# Patient Record
Sex: MALE | Race: WHITE | NOT HISPANIC OR LATINO | URBAN - METROPOLITAN AREA
[De-identification: names, ages, dates, MRNs, and addresses within clinical notes are randomized per-mention and may not be internally consistent; named-entity substitution may affect disease eponyms.]

---

## 2024-02-11 PROBLEM — M51.26 LUMBAR HERNIATED DISC: Status: ACTIVE | Noted: 2024-02-11

## 2024-03-06 ENCOUNTER — OFFICE VISIT (OUTPATIENT)
Dept: PRIMARY CARE | Facility: CLINIC | Age: 64
End: 2024-03-06
Payer: COMMERCIAL

## 2024-03-06 ENCOUNTER — LAB (OUTPATIENT)
Dept: LAB | Facility: LAB | Age: 64
End: 2024-03-06
Payer: COMMERCIAL

## 2024-03-06 VITALS
BODY MASS INDEX: 27 KG/M2 | HEART RATE: 57 BPM | HEIGHT: 67 IN | OXYGEN SATURATION: 96 % | WEIGHT: 172 LBS | DIASTOLIC BLOOD PRESSURE: 80 MMHG | SYSTOLIC BLOOD PRESSURE: 136 MMHG

## 2024-03-06 DIAGNOSIS — K21.00 GASTROESOPHAGEAL REFLUX DISEASE WITH ESOPHAGITIS WITHOUT HEMORRHAGE: ICD-10-CM

## 2024-03-06 DIAGNOSIS — E66.3 OVERWEIGHT WITH BODY MASS INDEX (BMI) OF 26 TO 26.9 IN ADULT: ICD-10-CM

## 2024-03-06 DIAGNOSIS — Z13.220 SCREENING, LIPID: ICD-10-CM

## 2024-03-06 DIAGNOSIS — R53.83 FATIGUE, UNSPECIFIED TYPE: ICD-10-CM

## 2024-03-06 DIAGNOSIS — Z13.31 NEGATIVE DEPRESSION SCREENING: ICD-10-CM

## 2024-03-06 DIAGNOSIS — Z00.00 ENCOUNTER FOR HEALTH MAINTENANCE EXAMINATION IN ADULT: ICD-10-CM

## 2024-03-06 DIAGNOSIS — K21.9 GASTROESOPHAGEAL REFLUX DISEASE, UNSPECIFIED WHETHER ESOPHAGITIS PRESENT: ICD-10-CM

## 2024-03-06 DIAGNOSIS — E55.9 VITAMIN D DEFICIENCY: ICD-10-CM

## 2024-03-06 DIAGNOSIS — N40.0 BENIGN PROSTATIC HYPERPLASIA, UNSPECIFIED WHETHER LOWER URINARY TRACT SYMPTOMS PRESENT: ICD-10-CM

## 2024-03-06 DIAGNOSIS — R19.5 POSITIVE COLORECTAL CANCER SCREENING USING COLOGUARD TEST: Primary | ICD-10-CM

## 2024-03-06 DIAGNOSIS — K22.2 SCHATZKI'S RING: ICD-10-CM

## 2024-03-06 DIAGNOSIS — R19.5 POSITIVE COLORECTAL CANCER SCREENING USING COLOGUARD TEST: ICD-10-CM

## 2024-03-06 PROBLEM — M54.9 BACK PAIN: Status: RESOLVED | Noted: 2024-03-06 | Resolved: 2024-03-06

## 2024-03-06 PROBLEM — R50.9 FEVER: Status: RESOLVED | Noted: 2018-02-20 | Resolved: 2024-03-06

## 2024-03-06 LAB
25(OH)D3 SERPL-MCNC: 33 NG/ML (ref 30–100)
ALBUMIN SERPL BCP-MCNC: 4.2 G/DL (ref 3.4–5)
ALP SERPL-CCNC: 65 U/L (ref 33–136)
ALT SERPL W P-5'-P-CCNC: 13 U/L (ref 10–52)
ANION GAP SERPL CALC-SCNC: 13 MMOL/L (ref 10–20)
APPEARANCE UR: CLEAR
AST SERPL W P-5'-P-CCNC: 22 U/L (ref 9–39)
BILIRUB SERPL-MCNC: 0.9 MG/DL (ref 0–1.2)
BILIRUB UR STRIP.AUTO-MCNC: NEGATIVE MG/DL
BUN SERPL-MCNC: 17 MG/DL (ref 6–23)
CALCIUM SERPL-MCNC: 9.3 MG/DL (ref 8.6–10.6)
CHLORIDE SERPL-SCNC: 107 MMOL/L (ref 98–107)
CHOLEST SERPL-MCNC: 174 MG/DL (ref 0–199)
CHOLESTEROL/HDL RATIO: 3.1
CO2 SERPL-SCNC: 27 MMOL/L (ref 21–32)
COLOR UR: YELLOW
CREAT SERPL-MCNC: 1.11 MG/DL (ref 0.5–1.3)
EGFRCR SERPLBLD CKD-EPI 2021: 74 ML/MIN/1.73M*2
ERYTHROCYTE [DISTWIDTH] IN BLOOD BY AUTOMATED COUNT: 12.9 % (ref 11.5–14.5)
GLUCOSE SERPL-MCNC: 88 MG/DL (ref 74–99)
GLUCOSE UR STRIP.AUTO-MCNC: NORMAL MG/DL
HCT VFR BLD AUTO: 37.2 % (ref 41–52)
HDLC SERPL-MCNC: 55.4 MG/DL
HGB BLD-MCNC: 12.6 G/DL (ref 13.5–17.5)
KETONES UR STRIP.AUTO-MCNC: NEGATIVE MG/DL
LDLC SERPL CALC-MCNC: 80 MG/DL
LEUKOCYTE ESTERASE UR QL STRIP.AUTO: NEGATIVE
MCH RBC QN AUTO: 31.2 PG (ref 26–34)
MCHC RBC AUTO-ENTMCNC: 33.9 G/DL (ref 32–36)
MCV RBC AUTO: 92 FL (ref 80–100)
NITRITE UR QL STRIP.AUTO: NEGATIVE
NON HDL CHOLESTEROL: 119 MG/DL (ref 0–149)
NRBC BLD-RTO: 0 /100 WBCS (ref 0–0)
PH UR STRIP.AUTO: 5.5 [PH]
PLATELET # BLD AUTO: 197 X10*3/UL (ref 150–450)
POTASSIUM SERPL-SCNC: 4.1 MMOL/L (ref 3.5–5.3)
PROT SERPL-MCNC: 6.9 G/DL (ref 6.4–8.2)
PROT UR STRIP.AUTO-MCNC: NEGATIVE MG/DL
PSA SERPL-MCNC: 1.54 NG/ML
RBC # BLD AUTO: 4.04 X10*6/UL (ref 4.5–5.9)
RBC # UR STRIP.AUTO: NEGATIVE /UL
SODIUM SERPL-SCNC: 143 MMOL/L (ref 136–145)
SP GR UR STRIP.AUTO: 1.02
TESTOST SERPL-MCNC: 513 NG/DL (ref 240–1000)
TRIGL SERPL-MCNC: 194 MG/DL (ref 0–149)
TSH SERPL-ACNC: 2.64 MIU/L (ref 0.44–3.98)
UROBILINOGEN UR STRIP.AUTO-MCNC: NORMAL MG/DL
VLDL: 39 MG/DL (ref 0–40)
WBC # BLD AUTO: 6.1 X10*3/UL (ref 4.4–11.3)

## 2024-03-06 PROCEDURE — 3008F BODY MASS INDEX DOCD: CPT | Performed by: NURSE PRACTITIONER

## 2024-03-06 PROCEDURE — 99386 PREV VISIT NEW AGE 40-64: CPT | Performed by: NURSE PRACTITIONER

## 2024-03-06 PROCEDURE — 84403 ASSAY OF TOTAL TESTOSTERONE: CPT

## 2024-03-06 PROCEDURE — 80053 COMPREHEN METABOLIC PANEL: CPT

## 2024-03-06 PROCEDURE — 82306 VITAMIN D 25 HYDROXY: CPT

## 2024-03-06 PROCEDURE — 84153 ASSAY OF PSA TOTAL: CPT

## 2024-03-06 PROCEDURE — 85027 COMPLETE CBC AUTOMATED: CPT

## 2024-03-06 PROCEDURE — 96127 BRIEF EMOTIONAL/BEHAV ASSMT: CPT | Performed by: NURSE PRACTITIONER

## 2024-03-06 PROCEDURE — 84443 ASSAY THYROID STIM HORMONE: CPT

## 2024-03-06 PROCEDURE — 1036F TOBACCO NON-USER: CPT | Performed by: NURSE PRACTITIONER

## 2024-03-06 PROCEDURE — 36415 COLL VENOUS BLD VENIPUNCTURE: CPT

## 2024-03-06 PROCEDURE — 81003 URINALYSIS AUTO W/O SCOPE: CPT

## 2024-03-06 PROCEDURE — 80061 LIPID PANEL: CPT

## 2024-03-06 RX ORDER — BISMUTH SUBSALICYLATE 262 MG
1 TABLET,CHEWABLE ORAL DAILY
COMMUNITY

## 2024-03-06 RX ORDER — ESOMEPRAZOLE MAGNESIUM 40 MG/1
40 CAPSULE, DELAYED RELEASE ORAL DAILY
COMMUNITY
Start: 2022-10-31 | End: 2024-03-06 | Stop reason: SDUPTHER

## 2024-03-06 RX ORDER — ASPIRIN 81 MG/1
162 TABLET ORAL DAILY
COMMUNITY

## 2024-03-06 RX ORDER — ESOMEPRAZOLE MAGNESIUM 40 MG/1
40 CAPSULE, DELAYED RELEASE ORAL DAILY
Qty: 90 CAPSULE | Refills: 3 | Status: SHIPPED | OUTPATIENT
Start: 2024-03-06 | End: 2024-03-17 | Stop reason: SDUPTHER

## 2024-03-06 ASSESSMENT — PATIENT HEALTH QUESTIONNAIRE - PHQ9
SUM OF ALL RESPONSES TO PHQ9 QUESTIONS 1 AND 2: 0
2. FEELING DOWN, DEPRESSED OR HOPELESS: NOT AT ALL
1. LITTLE INTEREST OR PLEASURE IN DOING THINGS: NOT AT ALL

## 2024-03-06 NOTE — PROGRESS NOTES
Annual Comprehensive Medical Exam:    63 y.o. female presents for annual comprehensive medical evaluation and preventive services screening.    New patient   New insurance    Recent hospitalizations, surgeries or ER visits: denies     Diet:    mix of healthy and unhealthy  Caffeine per day: 3  Water per day: 2  Exercise: denies  Alcohol: 1-2 per day  Tobacco: denies   Colonoscopy:   date                 f/u  Cologuard:  Positive     Allowed to report any questions or concerns.    Pt states that when he was young he had an endoscopy.   Was told that ?flappy esophageal sphincter and he would be on meds forever  Per previous provider notes:   EGD approx  (I can't find this report) results: Grade 2 esophagitis and Schatzki's ring   Med: Nexium  Working well    Cologuard was positive .   He was under the impression that his results were negative.     Prior [facility] lower back injury       Past Medical History:   Diagnosis Date    Eustachian tube dysfunction 03/15/2010    Fever 2018      Past Surgical History:   Procedure Laterality Date    ESOPHAGOGASTRODUODENOSCOPY       Family History   Problem Relation Name Age of Onset    Macular degeneration Mother      Heart attack Father          Allergic rxn to Mso4 which caused MI and Stroke    Stroke Father            Social History     Socioeconomic History    Marital status:      Spouse name: Not on file    Number of children: Not on file    Years of education: Not on file    Highest education level: Not on file   Occupational History    Not on file   Tobacco Use    Smoking status: Former     Types: Cigarettes     Quit date:      Years since quittin.2     Passive exposure: Never    Smokeless tobacco: Never   Substance and Sexual Activity    Alcohol use: Yes    Drug use: Never    Sexual activity: Not on file   Other Topics Concern    Not on file   Social History Narrative    Not on file     Social Determinants of Health     Financial Resource  "Strain: Not on file   Food Insecurity: Not on file   Transportation Needs: Not on file   Physical Activity: Not on file   Stress: Not on file   Social Connections: Not on file   Intimate Partner Violence: Not on file   Housing Stability: Not on file       Current Outpatient Medications on File Prior to Visit   Medication Sig Dispense Refill    aspirin 81 mg EC tablet Take 2 tablets (162 mg) by mouth once daily.      esomeprazole (NexIUM) 40 mg DR capsule Take 1 capsule (40 mg) by mouth once daily.      multivitamin tablet Take 1 tablet by mouth once daily.       No current facility-administered medications on file prior to visit.       No Known Allergies      Visit Vitals  /80   Pulse 57   Ht 1.702 m (5' 7\")   Wt 78 kg (172 lb)   SpO2 96%   BMI 26.94 kg/m²   Smoking Status Former   BSA 1.92 m²        Physical Exam  Gen: Alert and oriented x3 female in no acute distress.  HEENT: Head is normocephalic.  Pupils equal and reactive to light.    Neck is supple without adenopathy or carotid bruits.  Heart: Regular rate and rhythm without murmurs.  Lungs: Clear to auscultation bilaterally.  Breast: Declined.          Pelvic: Declined.         Abdomen: Soft with normal bowel sounds.  No masses or pain to palpation.  No bruits auscultated.  Extremities: Good range of motion of all joints.  No significant edema. Pedal pulses +1-2/4  Neuro: No signs of focal neurologic deficit.  No tremor.  Speech and hearing are normal.  DTRs +3/4;  Muscle Strength +5/5.  Musculoskeletal: Spine with good ROM.  Leg lengths are equal.  Skin: No significant or irregular nevi visualized.  Psych: normal affect.  No suicidal ideation.  Good judgment and insight.    Diagnosis/Plan:     1. Encounter for health maintenance examination in adult    - Vitamin D 25-Hydroxy,Total (for eval of Vitamin D levels); Future  - Lipid Panel; Future  - TSH with reflex to Free T4 if abnormal; Future  - Comprehensive Metabolic Panel; Future  - CBC; Future  - " PSA; Future  - Testosterone; Future  - Urinalysis with Reflex Microscopic; Future    2. Gastroesophageal reflux disease with esophagitis without hemorrhage  Discussed that he has to be seen by GI   - esomeprazole (NexIUM) 40 mg DR capsule; Take 1 capsule (40 mg) by mouth once daily.  Dispense: 90 capsule; Refill: 3    3. Schatzki's ring    - esomeprazole (NexIUM) 40 mg DR capsule; Take 1 capsule (40 mg) by mouth once daily.  Dispense: 90 capsule; Refill: 3    4. Positive colorectal cancer screening using Cologuard test  Aware of his cologuard results.   He needs a colonoscopy but he should also have EGD so Gastro referral made  - Referral to Gastroenterology; Future  -    5. Vitamin D deficiency  Vitamin D lab ordered. If your level is low,  a script for a weekly dose of Vitamin D will be sent to pharmacy. You should start or continue a Multivitamin.    - Vitamin D 25-Hydroxy,Total (for eval of Vitamin D levels); Future    8. Overweight with body mass index (BMI) of 26 to 26.9 in adult  Patient encouraged to follow diet of lower carbohydrates and increase vegetable and fruit intake.   Patient also encouraged to increase water intake to 80 ounces/day.   Start or continue exercise for at least 30 minutes a day on most days of the week.     offers various ways to learn about healthy eating and healthy weight loss.     9. Negative depression screening:  Depression screening completed.     Medications refills will be completed as discussed.     Any labs or testing that is ordered will be reviewed and the results will be in your chart .   You can review these via  [email or web address].     Follow up six months for medication management.     Prescriptions will not be filled unless you are compliant with your follow-up appointments or have a follow-up appointment scheduled as per the instruction of your provider. Refills for medications should be requested at the time of your office visit.     Please allow one week for refill  requests to be completed.     Contact office with any questions or concerns.   Preferred communication is via  [employer]  Please contact [email or web address] if having issues with  MyChart    Elidia Cantor [facility]  [address], [address]  [place] [address]  Phone: [phone]    **Charting was completed using voice recognition technology and may include unintended errors**

## 2024-03-09 DIAGNOSIS — E55.9 VITAMIN D DEFICIENCY: Primary | ICD-10-CM

## 2024-03-09 RX ORDER — ERGOCALCIFEROL 1.25 MG/1
50000 CAPSULE ORAL
Qty: 13 CAPSULE | Refills: 3 | Status: SHIPPED | OUTPATIENT
Start: 2024-03-09 | End: 2024-03-17 | Stop reason: SDUPTHER

## 2024-03-15 ENCOUNTER — TELEPHONE (OUTPATIENT)
Dept: PRIMARY CARE | Facility: CLINIC | Age: 64
End: 2024-03-15
Payer: COMMERCIAL

## 2024-03-15 NOTE — TELEPHONE ENCOUNTER
Could you please resend the nexium and vitamin D to the [facility] They were sent to [facility] by mistake.  Thank-you

## 2024-03-17 DIAGNOSIS — K22.2 SCHATZKI'S RING: ICD-10-CM

## 2024-03-17 DIAGNOSIS — E55.9 VITAMIN D DEFICIENCY: ICD-10-CM

## 2024-03-17 DIAGNOSIS — K21.00 GASTROESOPHAGEAL REFLUX DISEASE WITH ESOPHAGITIS WITHOUT HEMORRHAGE: ICD-10-CM

## 2024-03-17 RX ORDER — ERGOCALCIFEROL 1.25 MG/1
50000 CAPSULE ORAL
Qty: 13 CAPSULE | Refills: 3 | Status: SHIPPED | OUTPATIENT
Start: 2024-03-17

## 2024-03-17 RX ORDER — ESOMEPRAZOLE MAGNESIUM 40 MG/1
40 CAPSULE, DELAYED RELEASE ORAL DAILY
Qty: 90 CAPSULE | Refills: 1 | Status: SHIPPED | OUTPATIENT
Start: 2024-03-17

## 2024-04-29 ENCOUNTER — LAB (OUTPATIENT)
Dept: LAB | Facility: LAB | Age: 64
End: 2024-04-29
Payer: COMMERCIAL

## 2024-04-29 ENCOUNTER — OFFICE VISIT (OUTPATIENT)
Dept: GASTROENTEROLOGY | Facility: CLINIC | Age: 64
End: 2024-04-29
Payer: COMMERCIAL

## 2024-04-29 VITALS
HEART RATE: 54 BPM | DIASTOLIC BLOOD PRESSURE: 90 MMHG | TEMPERATURE: 97.1 F | BODY MASS INDEX: 27.57 KG/M2 | SYSTOLIC BLOOD PRESSURE: 155 MMHG | WEIGHT: 176 LBS

## 2024-04-29 DIAGNOSIS — D50.8 OTHER IRON DEFICIENCY ANEMIA: ICD-10-CM

## 2024-04-29 DIAGNOSIS — R19.5 POSITIVE COLORECTAL CANCER SCREENING USING COLOGUARD TEST: ICD-10-CM

## 2024-04-29 DIAGNOSIS — K21.9 GASTROESOPHAGEAL REFLUX DISEASE, UNSPECIFIED WHETHER ESOPHAGITIS PRESENT: Primary | ICD-10-CM

## 2024-04-29 LAB
FERRITIN SERPL-MCNC: 31 NG/ML (ref 20–300)
FOLATE SERPL-MCNC: >24 NG/ML
HCV AB SER QL: NONREACTIVE
IRON SATN MFR SERPL: 29 % (ref 25–45)
IRON SERPL-MCNC: 133 UG/DL (ref 35–150)
TIBC SERPL-MCNC: 452 UG/DL (ref 240–445)
UIBC SERPL-MCNC: 319 UG/DL (ref 110–370)
VIT B12 SERPL-MCNC: 472 PG/ML (ref 211–911)

## 2024-04-29 PROCEDURE — 82746 ASSAY OF FOLIC ACID SERUM: CPT

## 2024-04-29 PROCEDURE — 99204 OFFICE O/P NEW MOD 45 MIN: CPT | Performed by: NURSE PRACTITIONER

## 2024-04-29 PROCEDURE — 82728 ASSAY OF FERRITIN: CPT

## 2024-04-29 PROCEDURE — 83540 ASSAY OF IRON: CPT

## 2024-04-29 PROCEDURE — 86803 HEPATITIS C AB TEST: CPT

## 2024-04-29 PROCEDURE — 36415 COLL VENOUS BLD VENIPUNCTURE: CPT

## 2024-04-29 PROCEDURE — 1036F TOBACCO NON-USER: CPT | Performed by: NURSE PRACTITIONER

## 2024-04-29 PROCEDURE — 83550 IRON BINDING TEST: CPT

## 2024-04-29 PROCEDURE — 99214 OFFICE O/P EST MOD 30 MIN: CPT | Performed by: NURSE PRACTITIONER

## 2024-04-29 PROCEDURE — 82607 VITAMIN B-12: CPT

## 2024-04-29 PROCEDURE — 3008F BODY MASS INDEX DOCD: CPT | Performed by: NURSE PRACTITIONER

## 2024-04-29 ASSESSMENT — ENCOUNTER SYMPTOMS
ALLERGIC/IMMUNOLOGIC NEGATIVE: 1
ENDOCRINE NEGATIVE: 1
NEUROLOGICAL NEGATIVE: 1
DIFFICULTY URINATING: 0
EYES NEGATIVE: 1
CHEST TIGHTNESS: 0
FEVER: 0
DIAPHORESIS: 0
RESPIRATORY NEGATIVE: 1
ROS GI COMMENTS: SEE HPI
HEMATOLOGIC/LYMPHATIC NEGATIVE: 1
WHEEZING: 0
CHILLS: 0
MUSCULOSKELETAL NEGATIVE: 1
SHORTNESS OF BREATH: 0
APNEA: 0
FATIGUE: 0
CARDIOVASCULAR NEGATIVE: 1
STRIDOR: 0
PSYCHIATRIC NEGATIVE: 1
COUGH: 0

## 2024-04-29 ASSESSMENT — PAIN SCALES - GENERAL: PAINLEVEL: 0-NO PAIN

## 2024-04-29 NOTE — PROGRESS NOTES
Subjective   Patient ID: Daniel Parker is a 63 y.o. male who presents for Positive Cologuard test. PMH: h/o GERD, prior h/o Schatzki's ring (at 19), viral meningitis    Presents for positive cologuard  Denies diabetes, pacer/defib, denies sleep apnea, heart or lung issues   He has been using Omeprazole for 20+ years  He denies current dysphagia, odynophagia, GERD, will have vomiting and GERD when he does not take esomeprazole    He has a BM daily, denies rectal bleeding, abdominal pain, constipation, diarrhea, vomiting, night sweats, fevers, weight loss    Recent CMP, TSH    CBC showed slight anemia (12.6)    Social Hx: 1-2 whisky's nightly, he eats red meat, quit smoking 27 years ago  He denies drug use    Family Hx: Denies a family hx of CRC, GI cancers      Review of Systems   Constitutional:  Negative for chills, diaphoresis, fatigue and fever.   HENT: Negative.     Eyes: Negative.    Respiratory: Negative.  Negative for apnea, cough, chest tightness, shortness of breath, wheezing and stridor.    Cardiovascular: Negative.    Gastrointestinal:         See HPI    Endocrine: Negative.    Genitourinary: Negative.  Negative for difficulty urinating.   Musculoskeletal: Negative.    Skin: Negative.    Allergic/Immunologic: Negative.    Neurological: Negative.    Hematological: Negative.    Psychiatric/Behavioral: Negative.         Objective   Physical Exam  Constitutional:       Appearance: Normal appearance. He is normal weight.   HENT:      Nose: Nose normal.   Eyes:      General: Lids are normal.   Cardiovascular:      Rate and Rhythm: Normal rate and regular rhythm.      Heart sounds: Normal heart sounds.   Pulmonary:      Effort: Pulmonary effort is normal.      Breath sounds: Normal breath sounds.   Abdominal:      General: Bowel sounds are normal.   Musculoskeletal:         General: Normal range of motion.   Skin:     General: Skin is warm and dry.   Neurological:      Mental Status: He is alert and oriented to  person, place, and time.   Psychiatric:         Mood and Affect: Mood normal.         Assessment/Plan   Diagnoses and all orders for this visit:  Gastroesophageal reflux disease, unspecified whether esophagitis present  Positive colorectal cancer screening using Cologuard test  -   complete colonoscopy and EGD to r/o Manzanares's, Rings, etc     62 yo male with a PMH of h/o GERD, prior h/o Schatzki's ring (at 19), viral meningitis.   He presents today for a positive cologuard stool test. He denies a family hx or having a prior colonoscopy. He denies lower GI symptoms. He does have a long h/o GERD, mostly well-controlled with esomeprazole and a prior h/o Schatzki's ring and will complete EGD to r/o Manzanares's esophagus.     DELROY Soto-CNP 04/29/24 8:09 AM

## 2024-04-29 NOTE — PATIENT INSTRUCTIONS
It was nice to meet you    You will be scheduled for a colonoscopy and endoscopy   Please read all of the instructions 7 days before your colonoscopy.  You will need to take ONLY clear liquids the ENTIRE day before your procedure. These include (clear fruit juices, soda, Gatorade, broth, jello and coffee/tea) Avoid red and purple drinks. No cream or milk in the coffee.  You will need to take a bowel preparation.  You will also need a .      To schedule a procedure please call [phone]      Complete blood work today    If your EGD and Colonoscopy are negative please follow up with Elidia to evaluate anemia further

## 2024-09-16 ENCOUNTER — HOSPITAL ENCOUNTER (OUTPATIENT)
Dept: OPERATING ROOM | Facility: CLINIC | Age: 64
Discharge: HOME | End: 2024-09-16
Payer: COMMERCIAL

## 2024-09-16 ENCOUNTER — ANESTHESIA EVENT (OUTPATIENT)
Dept: OPERATING ROOM | Facility: CLINIC | Age: 64
End: 2024-09-16
Payer: COMMERCIAL

## 2024-09-16 ENCOUNTER — ANESTHESIA (OUTPATIENT)
Dept: OPERATING ROOM | Facility: CLINIC | Age: 64
End: 2024-09-16
Payer: COMMERCIAL

## 2024-09-16 VITALS
HEART RATE: 51 BPM | BODY MASS INDEX: 26.45 KG/M2 | WEIGHT: 168.87 LBS | SYSTOLIC BLOOD PRESSURE: 153 MMHG | RESPIRATION RATE: 16 BRPM | DIASTOLIC BLOOD PRESSURE: 89 MMHG | TEMPERATURE: 97.3 F | OXYGEN SATURATION: 100 %

## 2024-09-16 DIAGNOSIS — K21.9 GASTROESOPHAGEAL REFLUX DISEASE, UNSPECIFIED WHETHER ESOPHAGITIS PRESENT: ICD-10-CM

## 2024-09-16 DIAGNOSIS — D50.8 OTHER IRON DEFICIENCY ANEMIA: ICD-10-CM

## 2024-09-16 DIAGNOSIS — R19.5 POSITIVE COLORECTAL CANCER SCREENING USING COLOGUARD TEST: ICD-10-CM

## 2024-09-16 PROCEDURE — 3700000002 HC GENERAL ANESTHESIA TIME - EACH INCREMENTAL 1 MINUTE: Performed by: ANESTHESIOLOGY

## 2024-09-16 PROCEDURE — 45378 DIAGNOSTIC COLONOSCOPY: CPT | Performed by: INTERNAL MEDICINE

## 2024-09-16 PROCEDURE — 7100000009 HC PHASE TWO TIME - INITIAL BASE CHARGE: Performed by: ANESTHESIOLOGY

## 2024-09-16 PROCEDURE — 2500000004 HC RX 250 GENERAL PHARMACY W/ HCPCS (ALT 636 FOR OP/ED): Performed by: ANESTHESIOLOGIST ASSISTANT

## 2024-09-16 PROCEDURE — 43239 EGD BIOPSY SINGLE/MULTIPLE: CPT | Performed by: INTERNAL MEDICINE

## 2024-09-16 PROCEDURE — A45378 PR COLONOSCOPY,DIAGNOSTIC: Performed by: ANESTHESIOLOGY

## 2024-09-16 PROCEDURE — 3700000001 HC GENERAL ANESTHESIA TIME - INITIAL BASE CHARGE: Performed by: ANESTHESIOLOGY

## 2024-09-16 PROCEDURE — 2500000005 HC RX 250 GENERAL PHARMACY W/O HCPCS: Performed by: ANESTHESIOLOGIST ASSISTANT

## 2024-09-16 PROCEDURE — 7100000010 HC PHASE TWO TIME - EACH INCREMENTAL 1 MINUTE: Performed by: ANESTHESIOLOGY

## 2024-09-16 PROCEDURE — 3600000002 HC OR TIME - INITIAL BASE CHARGE - PROCEDURE LEVEL TWO: Performed by: ANESTHESIOLOGY

## 2024-09-16 PROCEDURE — 3600000007 HC OR TIME - EACH INCREMENTAL 1 MINUTE - PROCEDURE LEVEL TWO: Performed by: ANESTHESIOLOGY

## 2024-09-16 PROCEDURE — A45378 PR COLONOSCOPY,DIAGNOSTIC: Performed by: ANESTHESIOLOGIST ASSISTANT

## 2024-09-16 RX ORDER — ACETAMINOPHEN 325 MG/1
650 TABLET ORAL EVERY 4 HOURS PRN
Status: DISCONTINUED | OUTPATIENT
Start: 2024-09-16 | End: 2024-09-17 | Stop reason: HOSPADM

## 2024-09-16 RX ORDER — ONDANSETRON HYDROCHLORIDE 2 MG/ML
4 INJECTION, SOLUTION INTRAVENOUS ONCE AS NEEDED
Status: DISCONTINUED | OUTPATIENT
Start: 2024-09-16 | End: 2024-09-17 | Stop reason: HOSPADM

## 2024-09-16 RX ORDER — SODIUM CHLORIDE, SODIUM LACTATE, POTASSIUM CHLORIDE, CALCIUM CHLORIDE 600; 310; 30; 20 MG/100ML; MG/100ML; MG/100ML; MG/100ML
INJECTION, SOLUTION INTRAVENOUS CONTINUOUS PRN
Status: DISCONTINUED | OUTPATIENT
Start: 2024-09-16 | End: 2024-09-16

## 2024-09-16 RX ORDER — LIDOCAINE IN NACL,ISO-OSMOT/PF 30 MG/3 ML
0.1 SYRINGE (ML) INJECTION ONCE
Status: DISCONTINUED | OUTPATIENT
Start: 2024-09-16 | End: 2024-09-17 | Stop reason: HOSPADM

## 2024-09-16 RX ORDER — SODIUM CHLORIDE, SODIUM LACTATE, POTASSIUM CHLORIDE, CALCIUM CHLORIDE 600; 310; 30; 20 MG/100ML; MG/100ML; MG/100ML; MG/100ML
100 INJECTION, SOLUTION INTRAVENOUS CONTINUOUS
Status: DISCONTINUED | OUTPATIENT
Start: 2024-09-16 | End: 2024-09-17 | Stop reason: HOSPADM

## 2024-09-16 RX ORDER — PROPOFOL 10 MG/ML
INJECTION, EMULSION INTRAVENOUS CONTINUOUS PRN
Status: DISCONTINUED | OUTPATIENT
Start: 2024-09-16 | End: 2024-09-16

## 2024-09-16 RX ORDER — MIDAZOLAM HYDROCHLORIDE 1 MG/ML
INJECTION, SOLUTION INTRAMUSCULAR; INTRAVENOUS AS NEEDED
Status: DISCONTINUED | OUTPATIENT
Start: 2024-09-16 | End: 2024-09-16

## 2024-09-16 RX ORDER — ALBUTEROL SULFATE 0.83 MG/ML
2.5 SOLUTION RESPIRATORY (INHALATION) ONCE AS NEEDED
Status: DISCONTINUED | OUTPATIENT
Start: 2024-09-16 | End: 2024-09-17 | Stop reason: HOSPADM

## 2024-09-16 RX ORDER — LIDOCAINE HYDROCHLORIDE 20 MG/ML
INJECTION, SOLUTION INFILTRATION; PERINEURAL AS NEEDED
Status: DISCONTINUED | OUTPATIENT
Start: 2024-09-16 | End: 2024-09-16

## 2024-09-16 RX ORDER — GLYCOPYRROLATE 0.2 MG/ML
INJECTION INTRAMUSCULAR; INTRAVENOUS AS NEEDED
Status: DISCONTINUED | OUTPATIENT
Start: 2024-09-16 | End: 2024-09-16

## 2024-09-16 SDOH — HEALTH STABILITY: MENTAL HEALTH: CURRENT SMOKER: 0

## 2024-09-16 ASSESSMENT — PAIN - FUNCTIONAL ASSESSMENT
PAIN_FUNCTIONAL_ASSESSMENT: 0-10

## 2024-09-16 ASSESSMENT — PAIN SCALES - GENERAL
PAINLEVEL_OUTOF10: 0 - NO PAIN

## 2024-09-16 ASSESSMENT — COLUMBIA-SUICIDE SEVERITY RATING SCALE - C-SSRS
2. HAVE YOU ACTUALLY HAD ANY THOUGHTS OF KILLING YOURSELF?: NO
6. HAVE YOU EVER DONE ANYTHING, STARTED TO DO ANYTHING, OR PREPARED TO DO ANYTHING TO END YOUR LIFE?: NO
1. IN THE PAST MONTH, HAVE YOU WISHED YOU WERE DEAD OR WISHED YOU COULD GO TO SLEEP AND NOT WAKE UP?: NO

## 2024-09-16 NOTE — DISCHARGE INSTRUCTIONS
Patient Instructions after a Colonoscopy      The anesthetics, sedatives or narcotics which were given to you today will be acting in your body for the next 24 hours, so you might feel a little sleepy or groggy.  This feeling should slowly wear off. Carefully read and follow the instructions.     You received sedation today:  - Do not drive or operate any machinery or power tools of any kind.   - No alcoholic beverages today, not even beer or wine.  - Do not make any important decisions or sign any legal documents.  - No over the counter medications that contain alcohol or that may cause drowsiness.  - Do not make any important decisions or sign any legal documents.  - Make sure you have someone with you for first 24 hours.    While it is common to experience mild to moderate abdominal distention, gas, or belching after your procedure, if any of these symptoms occur following discharge from the GI Lab or within one week of having your procedure, call the [facility] to be advised whether a visit to your nearest Urgent Care or Emergency Department is indicated.  Take this paper with you if you go.     - If you develop an allergic reaction to the medications that were given during your procedure such as difficulty breathing, rash, hives, severe nausea, vomiting or lightheadedness.  - If you experience chest pain, shortness of breath, severe abdominal pain, fevers and chills.  -If you develop signs and symptoms of bleeding such as blood in your spit, if your stools turn black, tarry, or bloody  - If you have not urinated within 8 hours following your procedure.  - If your IV site becomes painful, red, inflamed, or looks infected.    If you received a biopsy/polypectomy/sphincterotomy the following instructions apply below:    __ Do not use Aspirin containing products, non-steroidal medications or anti-coagulants for one week following your procedure. (Examples of these types of medications are: Advil,  Arthrotec, Aleve, Coumadin, Ecotrin, Heparin, Ibuprofen, Indocin, Motrin, Naprosyn, Nuprin, Plavix, Vioxx, and Voltarin, or their generic forms.  This list is not all-inclusive.  Check with your physician or pharmacist before resuming medications.)   __ Eat a soft diet today.  Avoid foods that are poorly digested for the next 24 hours.  These foods would include: nuts, beans, lettuce, red meats, and fried foods. Start with liquids and advance your diet as tolerated, gradually work up to eating solids.   __ Do not have a Barium Study or Enema for one week.    Your physician recommends the additional following instructions:    -You have a contact number available for emergencies. The signs and symptoms of potential delayed complications were discussed with you. You may return to normal activities tomorrow.  -Resume your previous diet.  -Continue your present medications.   -We are waiting for your pathology results.  -Your physician has recommended a repeat colonoscopy (date to be determined after pending pathology results are reviewed) for surveillance based on pathology results.  -The findings and recommendations have been discussed with you.  -The findings and recommendations were discussed with your family.  - Please see Medication Reconciliation Form for new medication/medications prescribed.       If you experience any problems or have any questions following discharge from the GI Lab, please call:    Kojo Vargas MD  [phone]      Nurse Signature                                                                        Date___________________                                                                            Patient/Responsible Party Signature                                        Date___________________   No

## 2024-09-16 NOTE — ANESTHESIA PREPROCEDURE EVALUATION
Patient: Daniel Parker    Procedure Information       Date/Time: 24 1020    Scheduled providers: Kojo Vargas MD    Procedures:       COLONOSCOPY      EGD    Location: [facility] OR          64 y/o male with h/o HTN, GERD, here for EGD/colonoscopy    Relevant Problems   GI   (+) Esophageal reflux      Musculoskeletal   (+) Lumbar herniated disc       Clinical information reviewed:    Allergies  Meds               NPO Detail:  NPO/Void Status  Date of Last Liquid: 24  Time of Last Liquid: 0500         Vitals:    24 0929   BP: 156/82   Pulse: 58   Resp: 16   Temp: 36 °C (96.8 °F)   SpO2: 97%       Past Surgical History:   Procedure Laterality Date    ESOPHAGOGASTRODUODENOSCOPY       Past Medical History:   Diagnosis Date    Eustachian tube dysfunction 03/15/2010    Fever 2018       Current Outpatient Medications:     aspirin 81 mg EC tablet, Take 2 tablets (162 mg) by mouth once daily., Disp: , Rfl:     ergocalciferol (Vitamin D-2) 1.25 MG (54717 UT) capsule, Take 1 capsule (50,000 Units) by mouth 1 (one) time per week., Disp: 13 capsule, Rfl: 3    esomeprazole (NexIUM) 40 mg DR capsule, Take 1 capsule (40 mg) by mouth once daily., Disp: 90 capsule, Rfl: 1    multivitamin tablet, Take 1 tablet by mouth once daily., Disp: , Rfl:   No current facility-administered medications for this encounter.    Facility-Administered Medications Ordered in Other Encounters:     lactated Ringer's infusion, , intravenous, Continuous PRN, FIDEL Good, New Bag at 24 0928  No Known Allergies  Social History     Tobacco Use    Smoking status: Former     Current packs/day: 0.00     Types: Cigarettes     Quit date:      Years since quittin.7     Passive exposure: Never    Smokeless tobacco: Never   Substance Use Topics    Alcohol use: Yes     Alcohol/week: 7.0 standard drinks of alcohol     Types: 7 Cans of beer per week         Chemistry    Lab Results   Component  Value Date/Time     03/06/2024 1641    K 4.1 03/06/2024 1641     03/06/2024 1641    CO2 27 03/06/2024 1641    BUN 17 03/06/2024 1641    CREATININE 1.11 03/06/2024 1641    Lab Results   Component Value Date/Time    CALCIUM 9.3 03/06/2024 1641    ALKPHOS 65 03/06/2024 1641    AST 22 03/06/2024 1641    ALT 13 03/06/2024 1641    BILITOT 0.9 03/06/2024 1641          Lab Results   Component Value Date/Time    WBC 6.1 03/06/2024 1641    HGB 12.6 (L) 03/06/2024 1641    HCT 37.2 (L) 03/06/2024 1641     03/06/2024 1641         NPO Detail:  NPO/Void Status  Date of Last Liquid: 09/16/24  Time of Last Liquid: 0500         Review of Systems    Physical Exam    Airway  Mallampati: III  TM distance: >3 FB     Cardiovascular   Rhythm: regular     Dental        Pulmonary    Abdominal            Anesthesia Plan    History of general anesthesia?: no  History of complications of general anesthesia?: unknown/emergency    ASA 2     MAC   (GA-TIVA)  The patient is not a current smoker.    intravenous induction   Anesthetic plan and risks discussed with patient.    Plan discussed with attending and CAA.

## 2024-09-16 NOTE — H&P
History Of Present Illness  Daniel Parker is a 63 y.o. male presenting with GERD, positive Cologuard test.     Past Medical History  Past Medical History:   Diagnosis Date    Eustachian tube dysfunction 03/15/2010    Fever 02/20/2018     Surgical History  Past Surgical History:   Procedure Laterality Date    ESOPHAGOGASTRODUODENOSCOPY       Social History  He reports that he quit smoking about 34 years ago. His smoking use included cigarettes. He has never been exposed to tobacco smoke. He has never used smokeless tobacco. He reports current alcohol use of about 7.0 standard drinks of alcohol per week. He reports that he does not use drugs.    Family History  Family History   Problem Relation Name Age of Onset    Macular degeneration Mother      Heart attack Father          Allergic rxn to Mso4 which caused MI and Stroke    Stroke Father          Allergies  No Known Allergies  Review of Systems     Physical Exam  Vitals reviewed.   Constitutional:       Appearance: Normal appearance.   Cardiovascular:      Rate and Rhythm: Normal rate and regular rhythm.   Pulmonary:      Effort: Pulmonary effort is normal.      Breath sounds: Normal breath sounds.   Neurological:      Mental Status: He is alert.          Last Recorded Vitals  Blood pressure 156/82, pulse 58, temperature 36 °C (96.8 °F), temperature source Temporal, resp. rate 16, weight 76.6 kg (168 lb 14 oz), SpO2 97%.    Assessment/Plan   R/O neoplasm for EGD and colonoscopy     Kojo Vargas MD

## 2024-09-16 NOTE — ANESTHESIA POSTPROCEDURE EVALUATION
Patient: Daniel Parker    Procedure Summary       Date: 09/16/24 Room / Location: [facility] OR    Anesthesia Start: 0944 Anesthesia Stop: 1023    Procedures:       COLONOSCOPY      EGD Diagnosis:       Positive colorectal cancer screening using Cologuard test      Other iron deficiency anemia      Gastroesophageal reflux disease, unspecified whether esophagitis present    Scheduled Providers: Kojo Vargas MD Responsible Provider: Lilian Pond MD    Anesthesia Type: MAC ASA Status: 2            Anesthesia Type: MAC    Vitals Value Taken Time   /89 09/16/24 1052   Temp 36.3 °C (97.3 °F) 09/16/24 1052   Pulse 51 09/16/24 1052   Resp 16 09/16/24 1052   SpO2 100 % 09/16/24 1052       Anesthesia Post Evaluation    Patient location during evaluation: PACU  Patient participation: complete - patient participated  Level of consciousness: awake and alert  Pain management: adequate  Airway patency: patent  Cardiovascular status: acceptable  Respiratory status: acceptable  Hydration status: acceptable  Postoperative Nausea and Vomiting: none        There were no known notable events for this encounter.

## 2024-09-17 ASSESSMENT — PAIN SCALES - GENERAL: PAINLEVEL_OUTOF10: 0 - NO PAIN

## 2024-09-17 NOTE — ADDENDUM NOTE
Encounter addended by: Christina Barcenas RN on: 9/17/2024 9:41 AM   Actions taken: Flowsheet accepted

## 2024-09-17 NOTE — ADDENDUM NOTE
Encounter addended by: Christina Barcenas RN on: 9/17/2024 9:23 AM   Actions taken: Contacts section saved

## 2024-09-23 LAB
LABORATORY COMMENT REPORT: NORMAL
PATH REPORT.FINAL DX SPEC: NORMAL
PATH REPORT.GROSS SPEC: NORMAL
PATH REPORT.TOTAL CANCER: NORMAL

## 2024-10-14 ENCOUNTER — TELEPHONE (OUTPATIENT)
Dept: PRIMARY CARE | Facility: CLINIC | Age: 64
End: 2024-10-14

## 2024-10-14 DIAGNOSIS — K22.2 SCHATZKI'S RING: ICD-10-CM

## 2024-10-14 DIAGNOSIS — K21.00 GASTROESOPHAGEAL REFLUX DISEASE WITH ESOPHAGITIS WITHOUT HEMORRHAGE: ICD-10-CM

## 2024-10-14 RX ORDER — ESOMEPRAZOLE MAGNESIUM 40 MG/1
40 CAPSULE, DELAYED RELEASE ORAL DAILY
Qty: 90 CAPSULE | Refills: 3 | OUTPATIENT
Start: 2024-10-14

## 2024-10-15 NOTE — TELEPHONE ENCOUNTER
Pts wife called and said at pts visit 3-6-24 he was told that you will not fill his omeprazole until he sees a GI and has a Colonoscopy, EGD and labwork completed.  He has completed all 3 now. [facility] or should he be getting this from GI? Last refill was 3-17-24 for 90 days and 1 rf.

## 2024-10-16 ENCOUNTER — TELEPHONE (OUTPATIENT)
Dept: GASTROENTEROLOGY | Facility: HOSPITAL | Age: 64
End: 2024-10-16
Payer: COMMERCIAL

## 2024-10-16 DIAGNOSIS — K21.00 GASTROESOPHAGEAL REFLUX DISEASE WITH ESOPHAGITIS WITHOUT HEMORRHAGE: ICD-10-CM

## 2024-10-16 DIAGNOSIS — K22.2 SCHATZKI'S RING: ICD-10-CM

## 2024-10-16 RX ORDER — ESOMEPRAZOLE MAGNESIUM 40 MG/1
40 CAPSULE, DELAYED RELEASE ORAL DAILY
Qty: 90 CAPSULE | Refills: 1 | Status: SHIPPED | OUTPATIENT
Start: 2024-10-16

## 2025-02-25 ENCOUNTER — OFFICE VISIT (OUTPATIENT)
Dept: PODIATRY | Facility: CLINIC | Age: 65
End: 2025-02-25
Payer: COMMERCIAL

## 2025-02-25 DIAGNOSIS — R26.89 ANTALGIC GAIT: ICD-10-CM

## 2025-02-25 DIAGNOSIS — L60.0 ONYCHOCRYPTOSIS: ICD-10-CM

## 2025-02-25 DIAGNOSIS — M79.671 RIGHT FOOT PAIN: Primary | ICD-10-CM

## 2025-02-25 DIAGNOSIS — M72.2 PLANTAR FASCIITIS: ICD-10-CM

## 2025-02-25 PROCEDURE — 99204 OFFICE O/P NEW MOD 45 MIN: CPT | Performed by: PODIATRIST

## 2025-02-25 PROCEDURE — 2500000004 HC RX 250 GENERAL PHARMACY W/ HCPCS (ALT 636 FOR OP/ED): Performed by: PODIATRIST

## 2025-02-25 PROCEDURE — 20605 DRAIN/INJ JOINT/BURSA W/O US: CPT | Mod: RT | Performed by: PODIATRIST

## 2025-02-25 PROCEDURE — 1036F TOBACCO NON-USER: CPT | Performed by: PODIATRIST

## 2025-02-25 PROCEDURE — 1159F MED LIST DOCD IN RCRD: CPT | Performed by: PODIATRIST

## 2025-02-25 PROCEDURE — 99214 OFFICE O/P EST MOD 30 MIN: CPT | Mod: 25 | Performed by: PODIATRIST

## 2025-02-25 PROCEDURE — 1160F RVW MEDS BY RX/DR IN RCRD: CPT | Performed by: PODIATRIST

## 2025-02-25 PROCEDURE — 20605 DRAIN/INJ JOINT/BURSA W/O US: CPT | Performed by: PODIATRIST

## 2025-02-25 RX ORDER — TRIAMCINOLONE ACETONIDE 40 MG/ML
40 INJECTION, SUSPENSION INTRA-ARTICULAR; INTRAMUSCULAR ONCE
Status: COMPLETED | OUTPATIENT
Start: 2025-02-25 | End: 2025-02-25

## 2025-02-25 RX ADMIN — TRIAMCINOLONE ACETONIDE 40 MG: 40 INJECTION, SUSPENSION INTRA-ARTICULAR; INTRAMUSCULAR at 17:29

## 2025-02-25 ASSESSMENT — ENCOUNTER SYMPTOMS
HEMATOLOGIC/LYMPHATIC NEGATIVE: 1
PSYCHIATRIC NEGATIVE: 1
CONSTITUTIONAL NEGATIVE: 1
ENDOCRINE NEGATIVE: 1
ROS SKIN COMMENTS: NAIL PATHOLOGY
RESPIRATORY NEGATIVE: 1
ROS GI COMMENTS: GERD
ALLERGIC/IMMUNOLOGIC NEGATIVE: 1
CARDIOVASCULAR NEGATIVE: 1

## 2025-02-25 NOTE — PROGRESS NOTES
Chief Complaint   Patient presents with    new patient right heel pain   New patient with chief complaint of painful right heel.  Duration about a month.  No known trauma.  He does admit to post static dyskinesia.  He has tried some over-the-counter inserts with some success.  First occurrence.  Denies other problems.  He does wear good work boots.  He also complains of an ingrown nail right great toe.  This been a chronic problem for him.    He is a  for [employer].  Keeps quite active during the day.  Lots of ladders climbing stooping etc.    Non-smoker.    Past medical history of GERD.  He does avoid anti-inflammatories.  Recent colonoscopy and endoscopy.  These were negative.  Previous Cologuard test was positive.    Review of Systems   Constitutional: Negative.    HENT: Negative.     Respiratory: Negative.     Cardiovascular: Negative.    Gastrointestinal:         GERD   Endocrine: Negative.    Genitourinary: Negative.    Musculoskeletal:  Positive for gait problem.        Foot pain   Skin:         Nail pathology   Allergic/Immunologic: Negative.    Hematological: Negative.    Psychiatric/Behavioral: Negative.         General/Constitutional: Alert. NAD.   Respiratory: Non labored breathing.   Psychiatric: Mood and affect normal/baseline.   HEENT: Sclera clear. Wearing corrective lenses.  Dermatologic: Skin and nails intact except for medial border right great toenail onychocryptosis is noted.  Mild pain on palpation.  No acute inflammation denies any cellulitis no fluid accumulation.  Webspaces are dry no ulcers no pre-ulcerative areas.  Vascular: Pedal pulses are intact and symmetric including the dorsalis pedis and the posterior tibial pulses. Feet are warm to touch. No swelling appreciated either extremity.  Neurological: Alert and oriented. No acute distress. No sensory impairment bilateral.  Musculoskeletal: Strength is normal for age. No acute deficits appreciated.  Pain palpation plantar  medial calcaneal tuberosity consistent with plantar fasciitis.  No insertional Achilles pain.  No arch pain noted.  Evaluation of his shoe gear it does appear to be appropriate for him.  He does have PowerStep orthotics in the shoes.    Impression: Painful ingrown nail right great toe with painful plantar fasciitis right foot    -Today's treatment and course of therapy was discussed with the patient in detail. Patient's questions were answered. Proper foot care was discussed. This dictation was done using Dragon computer software and as such may contain grammatical errors.  Patient understand current course of therapy.    -Offered a Kenalog injection right heel.  Well-tolerated    -Discussed stretching icing topical Voltaren gel.  Avoid walking barefoot.  Continue with the inserts if they are helping.    -Will see you back in a couple of weeks    -Will remove ingrown nail at that point.    -If necessary we can get x-rays.    -Most recent notes reviewed.  Most recent labs reviewed.

## 2025-03-11 ENCOUNTER — OFFICE VISIT (OUTPATIENT)
Dept: PODIATRY | Facility: CLINIC | Age: 65
End: 2025-03-11
Payer: COMMERCIAL

## 2025-03-11 VITALS — HEIGHT: 67 IN | WEIGHT: 164 LBS | BODY MASS INDEX: 25.74 KG/M2

## 2025-03-11 DIAGNOSIS — L60.0 ONYCHOCRYPTOSIS: Primary | ICD-10-CM

## 2025-03-11 DIAGNOSIS — M72.2 PLANTAR FASCIITIS: ICD-10-CM

## 2025-03-11 DIAGNOSIS — M79.671 RIGHT FOOT PAIN: ICD-10-CM

## 2025-03-11 DIAGNOSIS — R26.89 ANTALGIC GAIT: ICD-10-CM

## 2025-03-11 PROCEDURE — 1036F TOBACCO NON-USER: CPT | Performed by: PODIATRIST

## 2025-03-11 PROCEDURE — 99213 OFFICE O/P EST LOW 20 MIN: CPT | Mod: 25 | Performed by: PODIATRIST

## 2025-03-11 PROCEDURE — 1160F RVW MEDS BY RX/DR IN RCRD: CPT | Performed by: PODIATRIST

## 2025-03-11 PROCEDURE — 99213 OFFICE O/P EST LOW 20 MIN: CPT | Performed by: PODIATRIST

## 2025-03-11 PROCEDURE — 11750 EXCISION NAIL&NAIL MATRIX: CPT | Mod: T5 | Performed by: PODIATRIST

## 2025-03-11 PROCEDURE — 1159F MED LIST DOCD IN RCRD: CPT | Performed by: PODIATRIST

## 2025-03-11 PROCEDURE — 11750 EXCISION NAIL&NAIL MATRIX: CPT | Performed by: PODIATRIST

## 2025-03-11 PROCEDURE — 3008F BODY MASS INDEX DOCD: CPT | Performed by: PODIATRIST

## 2025-03-11 ASSESSMENT — ENCOUNTER SYMPTOMS
ROS GI COMMENTS: GERD
PSYCHIATRIC NEGATIVE: 1
ALLERGIC/IMMUNOLOGIC NEGATIVE: 1
ROS SKIN COMMENTS: NAIL PATHOLOGY
RESPIRATORY NEGATIVE: 1
HEMATOLOGIC/LYMPHATIC NEGATIVE: 1
CONSTITUTIONAL NEGATIVE: 1
ENDOCRINE NEGATIVE: 1
CARDIOVASCULAR NEGATIVE: 1

## 2025-03-11 NOTE — PROGRESS NOTES
Chief Complaint   Patient presents with    F/U RT HEEL PAIN   Patient follows up for right foot pain secondary to planta fasciitis.  Some improvement.  He also has new over-the-counter orthotics which seem to help as well.  Ongoing pain ingrown nail right great toe.  This been a chronic problem for him.    He is a  for [employer].  Keeps quite active during the day.  Lots of ladders climbing stooping etc.    Non-smoker.    Past medical history of GERD.  He does avoid anti-inflammatories.  Recent colonoscopy and endoscopy.  These were negative.  Previous Cologuard test was positive.    Review of Systems   Constitutional: Negative.    HENT: Negative.     Respiratory: Negative.     Cardiovascular: Negative.    Gastrointestinal:         GERD   Endocrine: Negative.    Genitourinary: Negative.    Musculoskeletal:  Positive for gait problem.        Foot pain   Skin:         Nail pathology   Allergic/Immunologic: Negative.    Hematological: Negative.    Psychiatric/Behavioral: Negative.         General/Constitutional: Alert. NAD.   Respiratory: Non labored breathing.   Psychiatric: Mood and affect normal/baseline.   HEENT: Sclera clear. Wearing corrective lenses.  Dermatologic: Onychocryptosis right nailmedial border.  Painful to palpation.  Mild focal distal inflammation.  No ascending cellulitis.  No other acute interval changes.  Webspaces are dry no ulcers no pre-ulcerative areas.  Vascular: Pedal pulses are intact and symmetric including the dorsalis pedis and the posterior tibial pulses. Feet are warm to touch. No swelling appreciated either extremity.  Neurological: Alert and oriented. No acute distress. No sensory impairment bilateral.  Musculoskeletal: Strength is normal for age. No acute deficits appreciated.  Still with mild pain palpation plantar medial calcaneal tuberosity consistent with plantar fasciitis.  No insertional Achilles pain.  No arch pain noted.  Evaluation of his shoe gear it does  appear to be appropriate for him.  He does have Power[employer] orthotics in the shoes.    Impression: Painful ingrown nail right great toe and improved plantar fasciitis right foot    -Today's treatment and course of therapy was discussed with the patient in detail. Patient's questions were answered. Proper foot care was discussed. This dictation was done using Dragon computer software and as such may contain grammatical errors.  Patient understand current course of therapy.    -Nail 1 right medial border: Surgical chemical matrixectomy was performed in standard sterile fashion. Procedure was discussed in detail as well as the alternative course of action. Potential risks and complications including but not limited to recurrent nail pathology, infection, and delayed healing were all discussed and understood by the patient. Lidocaine 2% plain approximately 3.5 cc was utilized. The offending nail segment was removed. Area was inspected and irrigated.  Phenol application with irrigation after application. The area was once again inspected. No further nail remained. Dry sterile dressing was applied with topical antibiotic.    Home instructions were reviewed including warm water soaks twice daily ×7 days and then once daily ×1 week. This should be done using warm water and a small amount (a couple of tablespoons) of Epsom salts. Irrigated with clean water. Pat dry and apply topical antibiotic of choice such as Neosporin or other triple antibiotic and a sterile dressing. I do not recommend use of occlusive dressings area may stay too moist.    If any evidence of infection develop such as increased redness pain swelling or drainage or other acute problems patient will notify me immediately. Follow-up in 2 weeks.    -I still recommend icing and stretching right foot.  Continue with your inserts.

## 2025-04-02 ENCOUNTER — TELEPHONE (OUTPATIENT)
Dept: PRIMARY CARE | Facility: CLINIC | Age: 65
End: 2025-04-02
Payer: COMMERCIAL

## 2025-04-15 ENCOUNTER — APPOINTMENT (OUTPATIENT)
Dept: PODIATRY | Facility: CLINIC | Age: 65
End: 2025-04-15
Payer: COMMERCIAL

## 2025-04-23 ENCOUNTER — APPOINTMENT (OUTPATIENT)
Dept: PRIMARY CARE | Facility: CLINIC | Age: 65
End: 2025-04-23
Payer: COMMERCIAL

## 2025-04-23 VITALS
BODY MASS INDEX: 26.68 KG/M2 | DIASTOLIC BLOOD PRESSURE: 74 MMHG | HEART RATE: 60 BPM | SYSTOLIC BLOOD PRESSURE: 120 MMHG | OXYGEN SATURATION: 98 % | HEIGHT: 67 IN | TEMPERATURE: 98.5 F | WEIGHT: 170 LBS | RESPIRATION RATE: 18 BRPM

## 2025-04-23 DIAGNOSIS — E78.2 MIXED HYPERLIPIDEMIA: ICD-10-CM

## 2025-04-23 DIAGNOSIS — Z00.00 WELLNESS EXAMINATION: ICD-10-CM

## 2025-04-23 DIAGNOSIS — Z87.891 HISTORY OF TOBACCO USE: Primary | ICD-10-CM

## 2025-04-23 DIAGNOSIS — R25.2 LEG CRAMPS: ICD-10-CM

## 2025-04-23 PROCEDURE — 99397 PER PM REEVAL EST PAT 65+ YR: CPT

## 2025-04-23 PROCEDURE — 1159F MED LIST DOCD IN RCRD: CPT

## 2025-04-23 PROCEDURE — 3008F BODY MASS INDEX DOCD: CPT

## 2025-04-23 PROCEDURE — 99213 OFFICE O/P EST LOW 20 MIN: CPT

## 2025-04-23 PROCEDURE — 1036F TOBACCO NON-USER: CPT

## 2025-04-23 SDOH — ECONOMIC STABILITY: GENERAL
WHICH OF THE FOLLOWING DO YOU KNOW HOW TO USE AND HAVE ACCESS TO EVERY DAY? (CHOOSE ALL THAT APPLY): DESKTOP COMPUTER, LAPTOP COMPUTER, OR TABLET WITH BROADBAND INTERNET CONNECTION;SMARTPHONE WITH CELLULAR DATA PLAN

## 2025-04-23 SDOH — ECONOMIC STABILITY: FOOD INSECURITY: WITHIN THE PAST 12 MONTHS, YOU WORRIED THAT YOUR FOOD WOULD RUN OUT BEFORE YOU GOT MONEY TO BUY MORE.: NEVER TRUE

## 2025-04-23 SDOH — HEALTH STABILITY: PHYSICAL HEALTH: ON AVERAGE, HOW MANY DAYS PER WEEK DO YOU ENGAGE IN MODERATE TO STRENUOUS EXERCISE (LIKE A BRISK WALK)?: 7 DAYS

## 2025-04-23 SDOH — HEALTH STABILITY: PHYSICAL HEALTH: ON AVERAGE, HOW MANY MINUTES DO YOU ENGAGE IN EXERCISE AT THIS LEVEL?: 30 MIN

## 2025-04-23 SDOH — ECONOMIC STABILITY: TRANSPORTATION INSECURITY
IN THE PAST 12 MONTHS, HAS LACK OF TRANSPORTATION KEPT YOU FROM MEETINGS, WORK, OR FROM GETTING THINGS NEEDED FOR DAILY LIVING?: NO

## 2025-04-23 SDOH — ECONOMIC STABILITY: FOOD INSECURITY: WITHIN THE PAST 12 MONTHS, THE FOOD YOU BOUGHT JUST DIDN'T LAST AND YOU DIDN'T HAVE MONEY TO GET MORE.: NEVER TRUE

## 2025-04-23 SDOH — ECONOMIC STABILITY: INCOME INSECURITY: IN THE LAST 12 MONTHS, WAS THERE A TIME WHEN YOU WERE NOT ABLE TO PAY THE MORTGAGE OR RENT ON TIME?: NO

## 2025-04-23 SDOH — ECONOMIC STABILITY: TRANSPORTATION INSECURITY
IN THE PAST 12 MONTHS, HAS THE LACK OF TRANSPORTATION KEPT YOU FROM MEDICAL APPOINTMENTS OR FROM GETTING MEDICATIONS?: NO

## 2025-04-23 ASSESSMENT — SOCIAL DETERMINANTS OF HEALTH (SDOH)
WITHIN THE LAST YEAR, HAVE YOU BEEN AFRAID OF YOUR PARTNER OR EX-PARTNER?: NO
WITHIN THE LAST YEAR, HAVE YOU BEEN KICKED, HIT, SLAPPED, OR OTHERWISE PHYSICALLY HURT BY YOUR PARTNER OR EX-PARTNER?: NO
IN THE PAST 12 MONTHS, HAS THE ELECTRIC, GAS, OIL, OR WATER COMPANY THREATENED TO SHUT OFF SERVICE IN YOUR HOME?: NO
WITHIN THE LAST YEAR, HAVE TO BEEN RAPED OR FORCED TO HAVE ANY KIND OF SEXUAL ACTIVITY BY YOUR PARTNER OR EX-PARTNER?: NO
HOW HARD IS IT FOR YOU TO PAY FOR THE VERY BASICS LIKE FOOD, HOUSING, MEDICAL CARE, AND HEATING?: NOT HARD AT ALL
WITHIN THE LAST YEAR, HAVE YOU BEEN HUMILIATED OR EMOTIONALLY ABUSED IN OTHER WAYS BY YOUR PARTNER OR EX-PARTNER?: NO
IN A TYPICAL WEEK, HOW MANY TIMES DO YOU TALK ON THE PHONE WITH FAMILY, FRIENDS, OR NEIGHBORS?: MORE THAN THREE TIMES A WEEK
DO YOU BELONG TO ANY CLUBS OR ORGANIZATIONS SUCH AS CHURCH GROUPS UNIONS, FRATERNAL OR ATHLETIC GROUPS, OR SCHOOL GROUPS?: YES
HOW OFTEN DO YOU GET TOGETHER WITH FRIENDS OR RELATIVES?: THREE TIMES A WEEK
HOW OFTEN DO YOU ATTENT MEETINGS OF THE CLUB OR ORGANIZATION YOU BELONG TO?: MORE THAN 4 TIMES PER YEAR
HOW OFTEN DO YOU ATTEND CHURCH OR RELIGIOUS SERVICES?: MORE THAN 4 TIMES PER YEAR

## 2025-04-23 ASSESSMENT — PATIENT HEALTH QUESTIONNAIRE - PHQ9
1. LITTLE INTEREST OR PLEASURE IN DOING THINGS: NOT AT ALL
SUM OF ALL RESPONSES TO PHQ9 QUESTIONS 1 & 2: 0
2. FEELING DOWN, DEPRESSED OR HOPELESS: NOT AT ALL

## 2025-04-23 ASSESSMENT — ENCOUNTER SYMPTOMS
OCCASIONAL FEELINGS OF UNSTEADINESS: 0
DEPRESSION: 0
LOSS OF SENSATION IN FEET: 0

## 2025-04-23 ASSESSMENT — LIFESTYLE VARIABLES
AUDIT-C TOTAL SCORE: 0
HOW OFTEN DO YOU HAVE A DRINK CONTAINING ALCOHOL: NEVER
HOW OFTEN DO YOU HAVE SIX OR MORE DRINKS ON ONE OCCASION: NEVER
SKIP TO QUESTIONS 9-10: 1
HOW MANY STANDARD DRINKS CONTAINING ALCOHOL DO YOU HAVE ON A TYPICAL DAY: PATIENT DOES NOT DRINK

## 2025-04-23 NOTE — PROGRESS NOTES
"Subjective   Patient ID: Daniel Parker is a 64 y.o. male who presents for Establish Care (New patient transfer from [facility] and Annual Exam.    HPI   Patient here today to establish care. Previously following with [facility]  Works in multiple hard labor settings: Works for [employer], formerly was a   Acute concerns at this time include leg cramping which patient states to have nightly, will wake him up from sleep causing him to walk around his bed and then is able to go back to sleep.  Denies any cramping that he is noticed with exertion.    Due for annual labs, as elevated ASCVD risk however not currently on statin.  Is taking aspirin for primary prevention.  Completed colonoscopy last year, no notable polyps  Due for AAA screening    Review of Systems    Objective   /74 (BP Location: Right arm, Patient Position: Sitting)   Pulse 60   Temp 36.9 °C (98.5 °F)   Resp 18   Ht 1.702 m (5' 7\")   Wt 77.1 kg (170 lb)   SpO2 98%   BMI 26.63 kg/m²     Physical Exam  Constitutional:       General: He is not in acute distress.     Appearance: He is not ill-appearing.   HENT:      Right Ear: Tympanic membrane normal.      Left Ear: Tympanic membrane normal.      Nose: Nose normal. No congestion.      Mouth/Throat:      Pharynx: No oropharyngeal exudate or posterior oropharyngeal erythema.   Eyes:      General: No scleral icterus.     Extraocular Movements: Extraocular movements intact.   Cardiovascular:      Rate and Rhythm: Normal rate and regular rhythm.      Pulses: Normal pulses.      Heart sounds: Normal heart sounds. No murmur heard.     No friction rub. No gallop.      Comments: Palpable pulses in bilateral lower extremities  Pulmonary:      Effort: Pulmonary effort is normal.      Breath sounds: Normal breath sounds. No wheezing, rhonchi or rales.   Abdominal:      General: Abdomen is flat. There is no distension.      Palpations: Abdomen is soft.      Tenderness: There is no abdominal tenderness. " There is no guarding.   Musculoskeletal:      Cervical back: No tenderness.      Right lower leg: No edema.      Left lower leg: No edema.   Lymphadenopathy:      Cervical: No cervical adenopathy.   Skin:     General: Skin is warm and dry.      Findings: No rash.   Neurological:      General: No focal deficit present.      Mental Status: He is alert and oriented to person, place, and time.      Cranial Nerves: No cranial nerve deficit.      Deep Tendon Reflexes: Reflexes normal.   Psychiatric:         Mood and Affect: Mood normal.         Behavior: Behavior normal.         Assessment/Plan   Problem List Items Addressed This Visit    None  Visit Diagnoses         Codes      History of tobacco use    -  Primary Z87.891    Relevant Orders    CT cardiac scoring wo IV contrast    Vascular US Abdominal Aorta Aneurysm AAA Screening      Wellness examination     Z00.00    Relevant Orders    Vascular US Abdominal Aorta Aneurysm AAA Screening      Leg cramps     R25.2    Relevant Orders    Comprehensive Metabolic Panel    CBC and Auto Differential    Magnesium      Mixed hyperlipidemia     E78.2    Relevant Orders    CT cardiac scoring wo IV contrast    Lipid Panel        Regarding leg cramping will evaluate for any underlying electrolyte disturbance.  Consider GEGE given extensive smoking history  Discussed at length ASCVD risk, will further risk ratified with CT calcium scoring, patient amicable to statin if indicated  AAA screening ordered  Immunizations up-to-date  Colonoscopy Oct-2024  Will follow-up with patient based upon results of labs/imaging studies    Filippo Maria DO

## 2025-04-27 LAB
ALBUMIN SERPL-MCNC: 4.5 G/DL (ref 3.6–5.1)
ALP SERPL-CCNC: 64 U/L (ref 35–144)
ALT SERPL-CCNC: 14 U/L (ref 9–46)
ANION GAP SERPL CALCULATED.4IONS-SCNC: 5 MMOL/L (CALC) (ref 7–17)
AST SERPL-CCNC: 20 U/L (ref 10–35)
BASOPHILS # BLD AUTO: 18 CELLS/UL (ref 0–200)
BASOPHILS NFR BLD AUTO: 0.4 %
BILIRUB SERPL-MCNC: 0.8 MG/DL (ref 0.2–1.2)
BUN SERPL-MCNC: 18 MG/DL (ref 7–25)
CALCIUM SERPL-MCNC: 9.7 MG/DL (ref 8.6–10.3)
CHLORIDE SERPL-SCNC: 105 MMOL/L (ref 98–110)
CHOLEST SERPL-MCNC: 219 MG/DL
CHOLEST/HDLC SERPL: 2.8 (CALC)
CO2 SERPL-SCNC: 31 MMOL/L (ref 20–32)
CREAT SERPL-MCNC: 1.06 MG/DL (ref 0.7–1.35)
EGFRCR SERPLBLD CKD-EPI 2021: 78 ML/MIN/1.73M2
EOSINOPHIL # BLD AUTO: 170 CELLS/UL (ref 15–500)
EOSINOPHIL NFR BLD AUTO: 3.7 %
ERYTHROCYTE [DISTWIDTH] IN BLOOD BY AUTOMATED COUNT: 13.2 % (ref 11–15)
GLUCOSE SERPL-MCNC: 108 MG/DL (ref 65–99)
HCT VFR BLD AUTO: 37.9 % (ref 38.5–50)
HDLC SERPL-MCNC: 78 MG/DL
HGB BLD-MCNC: 12.1 G/DL (ref 13.2–17.1)
LDLC SERPL CALC-MCNC: 123 MG/DL (CALC)
LYMPHOCYTES # BLD AUTO: 1329 CELLS/UL (ref 850–3900)
LYMPHOCYTES NFR BLD AUTO: 28.9 %
MAGNESIUM SERPL-MCNC: 2.2 MG/DL (ref 1.5–2.5)
MCH RBC QN AUTO: 27.9 PG (ref 27–33)
MCHC RBC AUTO-ENTMCNC: 31.9 G/DL (ref 32–36)
MCV RBC AUTO: 87.3 FL (ref 80–100)
MONOCYTES # BLD AUTO: 428 CELLS/UL (ref 200–950)
MONOCYTES NFR BLD AUTO: 9.3 %
NEUTROPHILS # BLD AUTO: 2654 CELLS/UL (ref 1500–7800)
NEUTROPHILS NFR BLD AUTO: 57.7 %
NONHDLC SERPL-MCNC: 141 MG/DL (CALC)
PLATELET # BLD AUTO: 239 THOUSAND/UL (ref 140–400)
PMV BLD REES-ECKER: 10.2 FL (ref 7.5–12.5)
POTASSIUM SERPL-SCNC: 4.9 MMOL/L (ref 3.5–5.3)
PROT SERPL-MCNC: 7.1 G/DL (ref 6.1–8.1)
RBC # BLD AUTO: 4.34 MILLION/UL (ref 4.2–5.8)
SODIUM SERPL-SCNC: 141 MMOL/L (ref 135–146)
TRIGL SERPL-MCNC: 82 MG/DL
WBC # BLD AUTO: 4.6 THOUSAND/UL (ref 3.8–10.8)

## 2025-05-01 ENCOUNTER — OFFICE VISIT (OUTPATIENT)
Dept: PRIMARY CARE | Facility: CLINIC | Age: 65
End: 2025-05-01
Payer: COMMERCIAL

## 2025-05-01 VITALS
TEMPERATURE: 98.3 F | SYSTOLIC BLOOD PRESSURE: 152 MMHG | OXYGEN SATURATION: 98 % | RESPIRATION RATE: 16 BRPM | HEIGHT: 67 IN | BODY MASS INDEX: 25.9 KG/M2 | HEART RATE: 52 BPM | DIASTOLIC BLOOD PRESSURE: 70 MMHG | WEIGHT: 165 LBS

## 2025-05-01 DIAGNOSIS — D64.9 ANEMIA, UNSPECIFIED TYPE: ICD-10-CM

## 2025-05-01 DIAGNOSIS — R73.9 ELEVATED BLOOD SUGAR: Primary | ICD-10-CM

## 2025-05-01 DIAGNOSIS — D50.8 IRON DEFICIENCY ANEMIA SECONDARY TO INADEQUATE DIETARY IRON INTAKE: ICD-10-CM

## 2025-05-01 DIAGNOSIS — E78.2 MIXED HYPERLIPIDEMIA: ICD-10-CM

## 2025-05-01 LAB — POC HEMOGLOBIN A1C: 5.4 % (ref 4.2–6.5)

## 2025-05-01 PROCEDURE — 3008F BODY MASS INDEX DOCD: CPT

## 2025-05-01 PROCEDURE — 83036 HEMOGLOBIN GLYCOSYLATED A1C: CPT

## 2025-05-01 PROCEDURE — 1159F MED LIST DOCD IN RCRD: CPT

## 2025-05-01 PROCEDURE — 99213 OFFICE O/P EST LOW 20 MIN: CPT

## 2025-05-01 ASSESSMENT — PATIENT HEALTH QUESTIONNAIRE - PHQ9
2. FEELING DOWN, DEPRESSED OR HOPELESS: NOT AT ALL
1. LITTLE INTEREST OR PLEASURE IN DOING THINGS: NOT AT ALL
SUM OF ALL RESPONSES TO PHQ9 QUESTIONS 1 AND 2: 0

## 2025-05-03 LAB
FERRITIN SERPL-MCNC: 7 NG/ML (ref 24–380)
FOLATE SERPL-MCNC: 19.1 NG/ML
IRON SATN MFR SERPL: 12 % (CALC) (ref 20–48)
IRON SERPL-MCNC: 57 MCG/DL (ref 50–180)
TIBC SERPL-MCNC: 481 MCG/DL (CALC) (ref 250–425)
VIT B12 SERPL-MCNC: 463 PG/ML (ref 200–1100)

## 2025-05-04 DIAGNOSIS — D50.9 IRON DEFICIENCY ANEMIA, UNSPECIFIED IRON DEFICIENCY ANEMIA TYPE: Primary | ICD-10-CM

## 2025-05-04 RX ORDER — FERROUS SULFATE 325(65) MG
1 TABLET ORAL
Qty: 90 TABLET | Refills: 0 | Status: SHIPPED | OUTPATIENT
Start: 2025-05-04 | End: 2025-08-02

## 2025-05-05 ENCOUNTER — TELEPHONE (OUTPATIENT)
Dept: PRIMARY CARE | Facility: CLINIC | Age: 65
End: 2025-05-05
Payer: COMMERCIAL

## 2025-05-05 NOTE — TELEPHONE ENCOUNTER
Medication Request     Patients spouse calling stating that [facility] and so they are waiting on delivery. Patient would like to know if its possible to have just ten pills sent to to [facility] in [place].     Ferrous Sulfate - 325 mg tablet take one tablet by mouth once daily with breakfast.     [facility] [phone]

## 2025-05-06 RX ORDER — FERROUS SULFATE 325(65) MG
1 TABLET ORAL
Qty: 10 TABLET | Refills: 0 | Status: SHIPPED | OUTPATIENT
Start: 2025-05-06 | End: 2025-05-16

## 2025-05-08 NOTE — PROGRESS NOTES
"Subjective   Patient ID: Daniel Parker is a 64 y.o. male who presents for Follow-up (Review bloodwork/Wife Elena).    HPI   Patient here today to follow-up regarding recent blood work.  Was notable for elevations in serum cholesterol, mildly elevated fasting blood glucose, and anemia  States a balanced diet largely.  No previous iron deficiency.  Had recent upper and lower endoscopy.  C-scope without any concerning polyps did have some mild internal hemorrhoids.  Path report from EGD showed inactive gastritis.  No samira or noticeable bleeding that patient endorses.  Will check in office A1c.  Review of Systems    Objective   /70 (BP Location: Right arm, Patient Position: Sitting)   Pulse 52   Temp 36.8 °C (98.3 °F)   Resp 16   Ht 1.702 m (5' 7\")   Wt 74.8 kg (165 lb)   SpO2 98%   BMI 25.84 kg/m²     Physical Exam  Constitutional:       General: He is not in acute distress.     Appearance: Normal appearance. He is not ill-appearing.   Eyes:      General: No scleral icterus.  Cardiovascular:      Rate and Rhythm: Normal rate and regular rhythm.      Heart sounds: No murmur heard.     No friction rub. No gallop.   Pulmonary:      Effort: Pulmonary effort is normal. No respiratory distress.      Breath sounds: Normal breath sounds. No wheezing, rhonchi or rales.   Musculoskeletal:      Right lower leg: No edema.      Left lower leg: No edema.   Neurological:      General: No focal deficit present.      Mental Status: He is alert and oriented to person, place, and time.   Psychiatric:         Mood and Affect: Mood normal.         Behavior: Behavior normal.         Assessment/Plan   Problem List Items Addressed This Visit           ICD-10-CM    Iron deficiency anemia D50.9    Mixed hyperlipidemia E78.2     Other Visit Diagnoses         Codes      Elevated blood sugar    -  Primary R73.9    Relevant Orders    POCT glycosylated hemoglobin (Hb A1C) manually resulted (Completed)      Anemia, unspecified type     " D64.9    Relevant Orders    Iron and TIBC (Completed)    Vitamin B12 (Completed)    Folate (Completed)    Ferritin (Completed)        Laboratory evaluations were notable for hyperlipidemia, patient has outstanding order for CT calcium scoring which she has scheduled.  Recommend that he be started on statin medication however after lengthy discussion we will await results of CT calcium scoring for further risk ratification.  With current blood pressure patient's ASCVD risk at 14%, reviewed and explained this calculator to him and he states understand.  Will evaluate for iron deficiency or vitamin B deficiency driving patient's underlying anemia.  If positive will begin supplementing with oral iron.  Will contact patient with results of CT calcium scoring upon completion, follow-up in 90 days for repeat blood work.    Filippo Maria, DO

## 2025-05-12 PROBLEM — E78.5 HYPERLIPIDEMIA: Status: ACTIVE | Noted: 2025-05-12

## 2025-05-12 PROBLEM — E78.2 MIXED HYPERLIPIDEMIA: Status: ACTIVE | Noted: 2025-05-12

## 2025-05-21 ENCOUNTER — APPOINTMENT (OUTPATIENT)
Dept: RADIOLOGY | Facility: HOSPITAL | Age: 65
End: 2025-05-21
Payer: COMMERCIAL

## 2025-05-21 DIAGNOSIS — Z87.891 HISTORY OF TOBACCO USE: ICD-10-CM

## 2025-05-21 DIAGNOSIS — E78.2 MIXED HYPERLIPIDEMIA: ICD-10-CM

## 2025-05-21 PROCEDURE — 75571 CT HRT W/O DYE W/CA TEST: CPT

## 2025-05-28 ENCOUNTER — APPOINTMENT (OUTPATIENT)
Dept: PRIMARY CARE | Facility: CLINIC | Age: 65
End: 2025-05-28
Payer: COMMERCIAL

## 2025-05-28 VITALS
HEIGHT: 67 IN | OXYGEN SATURATION: 98 % | TEMPERATURE: 98.2 F | DIASTOLIC BLOOD PRESSURE: 62 MMHG | HEART RATE: 58 BPM | BODY MASS INDEX: 25.9 KG/M2 | RESPIRATION RATE: 16 BRPM | WEIGHT: 165 LBS | SYSTOLIC BLOOD PRESSURE: 124 MMHG

## 2025-05-28 DIAGNOSIS — E78.2 MIXED HYPERLIPIDEMIA: Primary | ICD-10-CM

## 2025-05-28 DIAGNOSIS — K21.9 GASTROESOPHAGEAL REFLUX DISEASE WITHOUT ESOPHAGITIS: ICD-10-CM

## 2025-05-28 DIAGNOSIS — D50.8 IRON DEFICIENCY ANEMIA SECONDARY TO INADEQUATE DIETARY IRON INTAKE: ICD-10-CM

## 2025-05-28 PROCEDURE — 3008F BODY MASS INDEX DOCD: CPT

## 2025-05-28 PROCEDURE — 1159F MED LIST DOCD IN RCRD: CPT

## 2025-05-28 PROCEDURE — 99214 OFFICE O/P EST MOD 30 MIN: CPT

## 2025-05-28 RX ORDER — SIMVASTATIN 20 MG/1
20 TABLET, FILM COATED ORAL NIGHTLY
Qty: 90 TABLET | Refills: 0 | Status: SHIPPED | OUTPATIENT
Start: 2025-05-28 | End: 2025-08-26

## 2025-05-29 NOTE — PROGRESS NOTES
"Subjective   Patient ID: Daniel Parker is a 64 y.o. male who presents for Follow-up (Review CT calcium score test./With wife Lois.).    HPI   History of Present Illness  The patient is a 64-year-old male who presents today to follow up regarding hyperlipidemia and calcium scoring.    He has a mild calcium score and has discussed the potential benefits of starting a statin medication to manage his cholesterol levels. He expressed concerns about taking medications, noting that he has only taken aspirin and omeprazole regularly since the age of 26, along with vitamins. He has been advised to discontinue aspirin due to the risk of bleeding outweighing its benefits in preventing heart attacks or strokes.    He has recently started taking iron supplements in the middle of May 2025 and is scheduled for a follow-up appointment on 07/09/2025 to assess the effectiveness of this treatment.    Additionally, he has been on omeprazole (previously referred to as Nexium) for acid reflux management. Initially prescribed a 20 mg dose, he experienced episodes of near-vomiting during sleep, leading to an increase to 40 mg. Since the dosage adjustment, he has not reported any further issues. He mentioned that a previous physician recommended lifelong use of omeprazole due to excessive acid production, while another physician suggested discontinuing it.       Review of Systems    Objective   /62 (BP Location: Left arm, Patient Position: Sitting)   Pulse 58   Temp 36.8 °C (98.2 °F)   Resp 16   Ht 1.702 m (5' 7\")   Wt 74.8 kg (165 lb)   SpO2 98%   BMI 25.84 kg/m²     Physical Exam  Constitutional:       General: He is not in acute distress.     Appearance: Normal appearance. He is not ill-appearing.   Eyes:      General: No scleral icterus.  Cardiovascular:      Rate and Rhythm: Normal rate and regular rhythm.      Heart sounds: No murmur heard.     No friction rub. No gallop.   Pulmonary:      Effort: Pulmonary effort is " normal. No respiratory distress.      Breath sounds: Normal breath sounds. No wheezing, rhonchi or rales.   Musculoskeletal:      Right lower leg: No edema.      Left lower leg: No edema.   Neurological:      General: No focal deficit present.      Mental Status: He is alert and oriented to person, place, and time.   Psychiatric:         Mood and Affect: Mood normal.         Behavior: Behavior normal.       Physical Exam         Assessment/Plan   Problem List Items Addressed This Visit           ICD-10-CM    Esophageal reflux K21.9    Iron deficiency anemia D50.9    Mixed hyperlipidemia - Primary E78.2    Relevant Medications    simvastatin (Zocor) 20 mg tablet    Other Relevant Orders    Hepatic function panel    Lipid panel     Assessment & Plan  1. Hyperlipidemia.  - His calcium score is notably low, indicating a mild presence of calcium in the coronary arteries.  - A moderate-intensity statin regimen is recommended.  - The potential side effects of statins, including muscle aches and liver injury, were discussed.  - He will be initiated on simvastatin therapy. If he experiences muscle aches, he should discontinue the medication and inform the provider. Liver function tests and cholesterol levels will be reassessed one month after starting the medication.    2. Medication management.  - He is currently taking omeprazole and has been advised that it is safe to continue this medication.  - The risks associated with long-term use of omeprazole, such as bone thinning, reduced calcium and magnesium levels, and increased risk of pneumonia, were discussed.  - He has been informed about the importance of continuing omeprazole due to his history of acid reflux.  - No changes to the omeprazole regimen are necessary at this time.    3. Iron deficiency.  - He started taking iron supplements in mid-May 2025.  - The supplementation will continue for a few months.  - His response to the iron supplementation will be evaluated at  the next visit.  - Follow-up appointment scheduled for 07/09/2025 to reassess iron levels and overall response to treatment.     Filippo Maria DO         This medical note was created with the assistance of artificial intelligence (AI) for documentation purposes. The content has been reviewed and confirmed by the healthcare provider for accuracy and completeness. Patient consented to the use of audio recording and use of AI during their visit.

## 2025-06-12 ENCOUNTER — HOSPITAL ENCOUNTER (OUTPATIENT)
Dept: VASCULAR MEDICINE | Facility: CLINIC | Age: 65
Discharge: HOME | End: 2025-06-12
Payer: COMMERCIAL

## 2025-06-12 DIAGNOSIS — Z87.891 HISTORY OF TOBACCO USE: ICD-10-CM

## 2025-06-12 DIAGNOSIS — Z13.6 ENCOUNTER FOR SCREENING FOR CARDIOVASCULAR DISORDERS: ICD-10-CM

## 2025-06-12 DIAGNOSIS — Z00.00 WELLNESS EXAMINATION: ICD-10-CM

## 2025-06-12 PROCEDURE — 76706 US ABDL AORTA SCREEN AAA: CPT

## 2025-06-12 PROCEDURE — 76706 US ABDL AORTA SCREEN AAA: CPT | Performed by: SURGERY

## 2025-06-28 DIAGNOSIS — E78.2 MIXED HYPERLIPIDEMIA: ICD-10-CM

## 2025-07-09 ENCOUNTER — APPOINTMENT (OUTPATIENT)
Dept: PRIMARY CARE | Facility: CLINIC | Age: 65
End: 2025-07-09
Payer: COMMERCIAL

## 2025-07-09 VITALS
OXYGEN SATURATION: 98 % | BODY MASS INDEX: 25.43 KG/M2 | HEART RATE: 66 BPM | HEIGHT: 67 IN | RESPIRATION RATE: 18 BRPM | DIASTOLIC BLOOD PRESSURE: 78 MMHG | SYSTOLIC BLOOD PRESSURE: 132 MMHG | TEMPERATURE: 97.2 F | WEIGHT: 162 LBS

## 2025-07-09 DIAGNOSIS — D50.9 IRON DEFICIENCY ANEMIA, UNSPECIFIED IRON DEFICIENCY ANEMIA TYPE: ICD-10-CM

## 2025-07-09 DIAGNOSIS — E78.2 MIXED HYPERLIPIDEMIA: Primary | ICD-10-CM

## 2025-07-09 PROCEDURE — 1159F MED LIST DOCD IN RCRD: CPT

## 2025-07-09 PROCEDURE — 3008F BODY MASS INDEX DOCD: CPT

## 2025-07-09 PROCEDURE — 99214 OFFICE O/P EST MOD 30 MIN: CPT

## 2025-07-09 RX ORDER — FERROUS SULFATE 325(65) MG
1 TABLET ORAL
Qty: 90 TABLET | Refills: 0 | Status: SHIPPED | OUTPATIENT
Start: 2025-07-09 | End: 2025-10-07

## 2025-07-09 ASSESSMENT — PATIENT HEALTH QUESTIONNAIRE - PHQ9
SUM OF ALL RESPONSES TO PHQ9 QUESTIONS 1 AND 2: 0
1. LITTLE INTEREST OR PLEASURE IN DOING THINGS: NOT AT ALL
2. FEELING DOWN, DEPRESSED OR HOPELESS: NOT AT ALL

## 2025-07-09 ASSESSMENT — SOCIAL DETERMINANTS OF HEALTH (SDOH): HOW HARD IS IT FOR YOU TO PAY FOR THE VERY BASICS LIKE FOOD, HOUSING, MEDICAL CARE, AND HEATING?: NOT HARD AT ALL

## 2025-07-15 NOTE — PROGRESS NOTES
"Subjective   Patient ID: Daniel Parker is a 64 y.o. male who presents for Follow-up (After starting iron and cholesterol).    HPI   History of Present Illness  The patient is a 64-year-old male who presents today for follow-up.    He reports an improvement in his condition, with less fatigue than before. He has been adhering to his iron supplement regimen, which he takes in the morning along with his vitamin and antacid, accompanied by a glass of orange juice. He occasionally forgets to take his medication at night but ensures he takes it the following day. He has not experienced any constipation as a side effect of the iron supplement.    He also mentions that he has been tolerating simvastatin well, with no new muscle aches or pains. However, he reports some bruising on his hands but suspects this to be secondary to his occupation..  He is compliant with lipid-lowering therapy       Review of Systems    Objective   /78   Pulse 66   Temp 36.2 °C (97.2 °F)   Resp 18   Ht 1.702 m (5' 7\")   Wt 73.5 kg (162 lb)   SpO2 98%   BMI 25.37 kg/m²     Physical Exam  Constitutional:       General: He is not in acute distress.     Appearance: Normal appearance. He is not ill-appearing.   Eyes:      General: No scleral icterus.  Cardiovascular:      Rate and Rhythm: Normal rate and regular rhythm.      Heart sounds: No murmur heard.     No friction rub. No gallop.   Pulmonary:      Effort: Pulmonary effort is normal. No respiratory distress.      Breath sounds: Normal breath sounds. No wheezing, rhonchi or rales.   Musculoskeletal:      Right lower leg: No edema.      Left lower leg: No edema.   Neurological:      General: No focal deficit present.      Mental Status: He is alert and oriented to person, place, and time.   Psychiatric:         Mood and Affect: Mood normal.         Behavior: Behavior normal.         Assessment/Plan   Problem List Items Addressed This Visit           ICD-10-CM    Iron deficiency anemia " D50.9    Relevant Medications    ferrous sulfate 325 mg (65 mg elemental) tablet    Other Relevant Orders    Ferritin    Iron and TIBC    Mixed hyperlipidemia - Primary E78.2     Assessment & Plan  1. Iron deficiency.  - Reports feeling better and less tired since starting iron supplements.  - No issues with constipation.  - Recheck of iron levels will be conducted next month to ensure appropriate increase.  Lab orders are within system  - Prescription for iron supplements will be reordered.    2. Hyperlipidemia.  - Currently taking simvastatin without any new muscle aches or pains.  Overall tolerating medication well, no reported adverse effects  - Continues taking simvastatin as prescribed.  - Lipid panel will be conducted by 08/30/2025 to monitor cholesterol levels.    3. Liver function monitoring.  - Liver function test will be conducted by 08/30/2025 to monitor liver health.  - Should complete a 12-hour fast prior to the test but can continue to drink water as usual.     Will schedule follow-up based upon results of laboratory evaluations.  Filippo Maria, DO       This medical note was created with the assistance of artificial intelligence (AI) for documentation purposes. The content has been reviewed and confirmed by the healthcare provider for accuracy and completeness. Patient consented to the use of audio recording and use of AI during their visit.

## 2025-07-17 ENCOUNTER — TELEPHONE (OUTPATIENT)
Dept: PRIMARY CARE | Facility: CLINIC | Age: 65
End: 2025-07-17
Payer: COMMERCIAL

## 2025-07-17 NOTE — TELEPHONE ENCOUNTER
Patient called and stated that he is having a lot of muscle pain and cramping since on simvastatin--he wants to know if you can prescribe something else. He said if you need to talk to him about this just give him a call    Thanks

## 2025-07-18 DIAGNOSIS — G72.0 STATIN MYOPATHY: Primary | ICD-10-CM

## 2025-07-18 DIAGNOSIS — T46.6X5A STATIN MYOPATHY: Primary | ICD-10-CM

## 2025-07-18 DIAGNOSIS — E78.2 MIXED HYPERLIPIDEMIA: ICD-10-CM

## 2025-07-19 LAB — CK SERPL-CCNC: 223 U/L (ref 22–308)

## 2025-07-21 RX ORDER — PRAVASTATIN SODIUM 40 MG/1
40 TABLET ORAL DAILY
Qty: 30 TABLET | Refills: 0 | Status: SHIPPED | OUTPATIENT
Start: 2025-07-21 | End: 2025-08-20

## 2025-07-30 LAB
ALBUMIN SERPL-MCNC: 4.6 G/DL (ref 3.6–5.1)
ALBUMIN/GLOB SERPL: 1.7 (CALC) (ref 1–2.5)
ALP SERPL-CCNC: 64 U/L (ref 35–144)
ALT SERPL-CCNC: 14 U/L (ref 9–46)
AST SERPL-CCNC: 21 U/L (ref 10–35)
BILIRUB DIRECT SERPL-MCNC: 0.2 MG/DL
BILIRUB INDIRECT SERPL-MCNC: 0.8 MG/DL (CALC) (ref 0.2–1.2)
BILIRUB SERPL-MCNC: 1 MG/DL (ref 0.2–1.2)
CHOLEST SERPL-MCNC: 187 MG/DL
CHOLEST/HDLC SERPL: 2.4 (CALC)
FERRITIN SERPL-MCNC: 24 NG/ML (ref 24–380)
GLOBULIN SER CALC-MCNC: 2.7 G/DL (CALC) (ref 1.9–3.7)
HDLC SERPL-MCNC: 79 MG/DL
IRON SATN MFR SERPL: 36 % (CALC) (ref 20–48)
IRON SERPL-MCNC: 146 MCG/DL (ref 50–180)
LDLC SERPL CALC-MCNC: 87 MG/DL (CALC)
NONHDLC SERPL-MCNC: 108 MG/DL (CALC)
PROT SERPL-MCNC: 7.3 G/DL (ref 6.1–8.1)
TIBC SERPL-MCNC: 404 MCG/DL (CALC) (ref 250–425)
TRIGL SERPL-MCNC: 109 MG/DL

## 2025-08-05 ENCOUNTER — HOSPITAL ENCOUNTER (EMERGENCY)
Facility: HOSPITAL | Age: 65
Discharge: HOME | End: 2025-08-05
Attending: EMERGENCY MEDICINE
Payer: COMMERCIAL

## 2025-08-05 VITALS
OXYGEN SATURATION: 100 % | SYSTOLIC BLOOD PRESSURE: 200 MMHG | HEART RATE: 51 BPM | RESPIRATION RATE: 18 BRPM | BODY MASS INDEX: 25.11 KG/M2 | WEIGHT: 160 LBS | TEMPERATURE: 98.1 F | DIASTOLIC BLOOD PRESSURE: 97 MMHG | HEIGHT: 67 IN

## 2025-08-05 DIAGNOSIS — M54.32 SCIATICA OF LEFT SIDE: Primary | ICD-10-CM

## 2025-08-05 PROCEDURE — 2500000001 HC RX 250 WO HCPCS SELF ADMINISTERED DRUGS (ALT 637 FOR MEDICARE OP): Performed by: EMERGENCY MEDICINE

## 2025-08-05 PROCEDURE — 99284 EMERGENCY DEPT VISIT MOD MDM: CPT | Performed by: EMERGENCY MEDICINE

## 2025-08-05 PROCEDURE — 96372 THER/PROPH/DIAG INJ SC/IM: CPT | Performed by: EMERGENCY MEDICINE

## 2025-08-05 PROCEDURE — 2500000004 HC RX 250 GENERAL PHARMACY W/ HCPCS (ALT 636 FOR OP/ED): Mod: JZ | Performed by: EMERGENCY MEDICINE

## 2025-08-05 PROCEDURE — 2500000005 HC RX 250 GENERAL PHARMACY W/O HCPCS: Performed by: EMERGENCY MEDICINE

## 2025-08-05 RX ORDER — METHOCARBAMOL 750 MG/1
TABLET, FILM COATED ORAL
Qty: 30 TABLET | Refills: 0 | Status: SHIPPED | OUTPATIENT
Start: 2025-08-05

## 2025-08-05 RX ORDER — OXYCODONE AND ACETAMINOPHEN 5; 325 MG/1; MG/1
1 TABLET ORAL ONCE
Refills: 0 | Status: COMPLETED | OUTPATIENT
Start: 2025-08-05 | End: 2025-08-05

## 2025-08-05 RX ORDER — KETOROLAC TROMETHAMINE 15 MG/ML
15 INJECTION, SOLUTION INTRAMUSCULAR; INTRAVENOUS ONCE
Status: COMPLETED | OUTPATIENT
Start: 2025-08-05 | End: 2025-08-05

## 2025-08-05 RX ORDER — PREDNISONE 50 MG/1
50 TABLET ORAL DAILY
Qty: 5 TABLET | Refills: 0 | Status: SHIPPED | OUTPATIENT
Start: 2025-08-05 | End: 2025-08-10

## 2025-08-05 RX ORDER — LIDOCAINE 560 MG/1
1 PATCH PERCUTANEOUS; TOPICAL; TRANSDERMAL ONCE
Status: DISCONTINUED | OUTPATIENT
Start: 2025-08-05 | End: 2025-08-05 | Stop reason: HOSPADM

## 2025-08-05 RX ADMIN — DEXAMETHASONE SODIUM PHOSPHATE 10 MG: 4 INJECTION, SOLUTION INTRAMUSCULAR; INTRAVENOUS at 05:35

## 2025-08-05 RX ADMIN — OXYCODONE HYDROCHLORIDE AND ACETAMINOPHEN 1 TABLET: 5; 325 TABLET ORAL at 05:41

## 2025-08-05 RX ADMIN — KETOROLAC TROMETHAMINE 15 MG: 15 INJECTION, SOLUTION INTRAMUSCULAR; INTRAVENOUS at 05:35

## 2025-08-05 RX ADMIN — LIDOCAINE 4% 1 PATCH: 40 PATCH TOPICAL at 05:35

## 2025-08-05 ASSESSMENT — PAIN DESCRIPTION - DESCRIPTORS: DESCRIPTORS: STABBING

## 2025-08-05 ASSESSMENT — PAIN DESCRIPTION - PAIN TYPE: TYPE: ACUTE PAIN

## 2025-08-05 ASSESSMENT — PAIN DESCRIPTION - LOCATION: LOCATION: BACK

## 2025-08-05 ASSESSMENT — PAIN DESCRIPTION - ORIENTATION: ORIENTATION: LEFT;LOWER

## 2025-08-05 ASSESSMENT — PAIN - FUNCTIONAL ASSESSMENT: PAIN_FUNCTIONAL_ASSESSMENT: 0-10

## 2025-08-05 ASSESSMENT — PAIN DESCRIPTION - PROGRESSION: CLINICAL_PROGRESSION: NOT CHANGED

## 2025-08-05 ASSESSMENT — PAIN DESCRIPTION - DIRECTION: RADIATING_TOWARDS: LEFT LEG

## 2025-08-05 ASSESSMENT — PAIN SCALES - GENERAL: PAINLEVEL_OUTOF10: 10 - WORST POSSIBLE PAIN

## 2025-08-05 ASSESSMENT — PAIN DESCRIPTION - FREQUENCY: FREQUENCY: CONSTANT/CONTINUOUS

## 2025-08-05 ASSESSMENT — PAIN DESCRIPTION - ONSET: ONSET: SUDDEN

## 2025-08-05 NOTE — ED PROVIDER NOTES
EMERGENCY DEPARTMENT ENCOUNTER      Pt Name: Daniel Parker  MRN: [id number]  Birthdate 1960  Date of evaluation: 8/5/2025    HISTORY OF PRESENT ILLNESS    Daniel Parker is an 64 y.o. male with complaint of left lower back pain and radicular pain down left leg.  History of sciatica few years ago.  Has been well since.  His back felt sore yesterday after working.  Not unusual.  However this morning when he got up he had lot of spasms went down the ground.  He is having pain down to the left thigh region.  Denies any bowel or bladder problems no saddle anesthesia.  No treatment for this yet.  He did call off work today due to his pain  PAST MEDICAL HISTORY   Medical History[1]    SURGICAL HISTORY     Surgical History[2]    CURRENT MEDICATIONS       Previous Medications    ESOMEPRAZOLE (NEXIUM) 40 MG DR CAPSULE    TAKE 1 CAPSULE ONCE DAILY    FERROUS SULFATE 325 MG (65 MG ELEMENTAL) TABLET    Take 1 tablet by mouth once daily with breakfast.    MULTIVITAMIN TABLET    Take 1 tablet by mouth once daily.    PRAVASTATIN (PRAVACHOL) 40 MG TABLET    Take 1 tablet (40 mg) by mouth once daily.       ALLERGIES     Patient has no known allergies.    FAMILY HISTORY     Family History[3]     SOCIAL HISTORY     Social History[4]    PHYSICAL EXAM       ED Triage Vitals [08/05/25 0509]   Temperature Heart Rate Respirations BP   36.7 °C (98.1 °F) 51 18 (!) 200/97      Pulse Ox Temp Source Heart Rate Source Patient Position   100 % Temporal Monitor Sitting      BP Location FiO2 (%)     Right arm --       Physical Exam  Vitals and nursing note reviewed.     Musculoskeletal:         General: No tenderness. Normal range of motion.        Arms:       Comments: Reproducible left lumbar paraspinal muscle tenderness.  No midline pain.  No step-off.     Skin:     General: Skin is warm.     Neurological:      General: No focal deficit present.      Mental Status: He is oriented to person, place, and time.      Cranial Nerves: Cranial nerves  2-12 are intact. No cranial nerve deficit.      Sensory: Sensation is intact. No sensory deficit.      Motor: Motor function is intact. No weakness.      Deep Tendon Reflexes:      Reflex Scores:       Patellar reflexes are 1+ on the right side and 1+ on the left side.       Achilles reflexes are 1+ on the right side and 1+ on the left side.         DIAGNOSTIC RESULTS     LABS:  Labs Reviewed - No data to display    All other labs were within normal range or not returned as of this dictation.    Imaging  No orders to display        Procedures  Procedures     EMERGENCY DEPARTMENT COURSE/MDM:   Medical Decision Making  Patient's symptoms and exam suggest sciatica involving the left side.  There is an MRI from a few years ago which shows 3 herniated disks between levels L3-S1.  Patient's exam is not suggestive of any neuro spinal emergency.  I do not believe any imaging is needed.  Patient drove himself and we will give him some intramuscular Toradol and Decadron.  Lidocaine patch.  I will send him home with a few more days of steroids and muscle laxer's.  He will follow-up with his family doctor for recheck if not improving.  His blood pressure was elevated.  He denies any chest pain headache or any endorgan damage type symptoms related to this.  Most likely related to pain.  Patient will keep an eye on his pressure at home.    Amount and/or Complexity of Data Reviewed  External Data Reviewed: radiology.     Details: MRI from 2021        Diagnoses as of 08/05/25 0527   Sciatica of left side        External records reviewed: recent inpatient, clinic, and prior ED notes  Labs and Diagnostic imaging independently reviewed/interpreted by me.    Patient plan, care, lab results and imaging were all discussed with attending.    ED Medications administered this visit:    Medications   lidocaine 4 % patch 1 patch (has no administration in time range)   ketorolac (Toradol) injection 15 mg (has no administration in time range)    dexAMETHasone (Decadron) injection 10 mg (has no administration in time range)   oxyCODONE-acetaminophen (Percocet) 5-325 mg per tablet 1 tablet (has no administration in time range)     New Prescriptions from this visit:    New Prescriptions    METHOCARBAMOL (ROBAXIN) 750 MG TABLET    1-2 tabs p.o. every 8 hours as needed spasms    PREDNISONE (DELTASONE) 50 MG TABLET    Take 1 tablet (50 mg) by mouth once daily for 5 days.       (Please note that portions of this note were completed with a voice recognition program.  Efforts were made to edit the dictations but occasionally words are mis-transcribed.)                                                        [1]   Past Medical History:  Diagnosis Date    Eustachian tube dysfunction 03/15/2010    Fever 2018    GERD (gastroesophageal reflux disease)    [2]   Past Surgical History:  Procedure Laterality Date    ESOPHAGOGASTRODUODENOSCOPY     [3]   Family History  Problem Relation Name Age of Onset    Macular degeneration Mother      Heart attack Father          Allergic rxn to Mso4 which caused MI and Stroke    Stroke Father     [4]   Social History  Socioeconomic History    Marital status:    Tobacco Use    Smoking status: Former     Current packs/day: 0.00     Types: Cigarettes     Quit date: 1997     Years since quittin.6     Passive exposure: Never    Smokeless tobacco: Never   Vaping Use    Vaping status: Never Used   Substance and Sexual Activity    Alcohol use: Yes     Alcohol/week: 7.0 standard drinks of alcohol     Types: 7 Shots of liquor per week    Drug use: Never    Sexual activity: Yes     Partners: Female     Birth control/protection: None     Social Drivers of Health     Financial Resource Strain: Low Risk  (2025)    Overall Financial Resource Strain (CARDIA)     Difficulty of Paying Living Expenses: Not hard at all   Food Insecurity: No Food Insecurity (2025)    Hunger Vital Sign     Worried About Running Out of Food in  the Last Year: Never true     Ran Out of Food in the Last Year: Never true   Transportation Needs: No Transportation Needs (7/9/2025)    PRAPARE - Transportation     Lack of Transportation (Medical): No     Lack of Transportation (Non-Medical): No   Physical Activity: Sufficiently Active (4/23/2025)    Exercise Vital Sign     Days of Exercise per Week: 7 days     Minutes of Exercise per Session: 30 min   Stress: No Stress Concern Present (4/23/2025)    [employer] - Occupational Stress Questionnaire     Feeling of Stress : Not at all   Social Connections: Socially Integrated (4/23/2025)    Social Connection and Isolation Panel     Frequency of Communication with Friends and Family: More than three times a week     Frequency of Social Gatherings with Friends and Family: Three times a week     Attends Orthodoxy Services: More than 4 times per year     Active Member of Clubs or Organizations: Yes     Attends Club or Organization Meetings: More than 4 times per year     Marital Status:    Intimate Partner Violence: Not At Risk (4/23/2025)    Humiliation, Afraid, Rape, and Kick questionnaire     Fear of Current or Ex-Partner: No     Emotionally Abused: No     Physically Abused: No     Sexually Abused: No   Housing Stability: Low Risk  (4/23/2025)    Housing Stability Vital Sign     Unable to Pay for Housing in the Last Year: No     Number of Times Moved in the Last Year: 0     Homeless in the Last Year: No        Erick Smith MD  08/05/25 0614

## 2025-08-05 NOTE — Clinical Note
Daniel Parker was seen and treated in our emergency department on 8/5/2025.  He may return to work on 08/07/2025.       If you have any questions or concerns, please don't hesitate to call.      Erick Smith MD

## 2025-08-05 NOTE — DISCHARGE INSTRUCTIONS
- Use muscle relaxers as directed, please do not drink drive or operate machinery while using it  -Take prednisone as prescribed  -Can replace this lidocaine patch in 24 hours after 12-hour break.  They are available over-the-counter  -If you are not improving please your family doctor's office as you may need further evaluation and treatment.

## 2025-08-06 ENCOUNTER — TELEPHONE (OUTPATIENT)
Dept: PRIMARY CARE | Facility: CLINIC | Age: 65
End: 2025-08-06
Payer: COMMERCIAL

## 2025-08-06 NOTE — TELEPHONE ENCOUNTER
YVANI pt called saying he got injured at work and went to ER. Told pt that he would have to see a workers comp doctor. Pt was requesting a back to work note told him he can't do that because it would be workers comp.

## 2025-08-22 ENCOUNTER — APPOINTMENT (OUTPATIENT)
Dept: PRIMARY CARE | Facility: CLINIC | Age: 65
End: 2025-08-22
Payer: COMMERCIAL